# Patient Record
Sex: FEMALE | Race: WHITE | NOT HISPANIC OR LATINO | Employment: FULL TIME | URBAN - METROPOLITAN AREA
[De-identification: names, ages, dates, MRNs, and addresses within clinical notes are randomized per-mention and may not be internally consistent; named-entity substitution may affect disease eponyms.]

---

## 2019-03-14 ENCOUNTER — OFFICE VISIT (OUTPATIENT)
Dept: OBGYN CLINIC | Facility: CLINIC | Age: 49
End: 2019-03-14
Payer: COMMERCIAL

## 2019-03-14 VITALS
DIASTOLIC BLOOD PRESSURE: 70 MMHG | HEIGHT: 63 IN | WEIGHT: 137 LBS | SYSTOLIC BLOOD PRESSURE: 116 MMHG | BODY MASS INDEX: 24.27 KG/M2

## 2019-03-14 DIAGNOSIS — Z12.39 SCREENING BREAST EXAMINATION: ICD-10-CM

## 2019-03-14 DIAGNOSIS — Z01.419 CERVICAL SMEAR, AS PART OF ROUTINE GYNECOLOGICAL EXAMINATION: ICD-10-CM

## 2019-03-14 DIAGNOSIS — Z13.820 SCREENING FOR OSTEOPOROSIS: ICD-10-CM

## 2019-03-14 DIAGNOSIS — Z11.51 SPECIAL SCREENING EXAMINATION FOR HUMAN PAPILLOMAVIRUS (HPV): ICD-10-CM

## 2019-03-14 DIAGNOSIS — E28.39 PREMATURE OVARIAN FAILURE: ICD-10-CM

## 2019-03-14 DIAGNOSIS — Z01.419 ENCOUNTER FOR WELL WOMAN EXAM: Primary | ICD-10-CM

## 2019-03-14 PROCEDURE — 99386 PREV VISIT NEW AGE 40-64: CPT | Performed by: PHYSICIAN ASSISTANT

## 2019-03-14 RX ORDER — ESTRADIOL 0.03 MG/D
1 FILM, EXTENDED RELEASE TRANSDERMAL 2 TIMES WEEKLY
COMMUNITY
End: 2019-03-20

## 2019-03-20 LAB
HPV HR 12 DNA CVX QL NAA+PROBE: NOT DETECTED
HPV16 DNA SPEC QL NAA+PROBE: NOT DETECTED
HPV18 DNA SPEC QL NAA+PROBE: NOT DETECTED
THIN PREP CVX: NORMAL

## 2019-03-20 RX ORDER — ESTRADIOL 1 MG/1
0.5 TABLET ORAL 3 TIMES DAILY
Qty: 90 TABLET | Refills: 4 | Status: SHIPPED | OUTPATIENT
Start: 2019-03-20 | End: 2020-03-16 | Stop reason: SDUPTHER

## 2019-03-20 RX ORDER — MEDROXYPROGESTERONE ACETATE 5 MG/1
5 TABLET ORAL DAILY
Qty: 30 TABLET | Refills: 11 | Status: SHIPPED | OUTPATIENT
Start: 2019-03-20 | End: 2020-03-16 | Stop reason: SDUPTHER

## 2019-03-20 NOTE — PROGRESS NOTES
Assessment/Plan:    No problem-specific Assessment & Plan notes found for this encounter  Diagnoses and all orders for this visit:    Encounter for well woman exam    Screening breast examination  -     Mammo screening bilateral w cad; Future    Cervical smear, as part of routine gynecological examination  -     GP Pap Dependent HPV Cotest >= 27years old    Special screening examination for human papillomavirus (HPV)  -     GP Pap Dependent HPV Cotest >= 27years old    Premature ovarian failure  -     DXA bone density spine hip and pelvis; Future  -     medroxyPROGESTERone (PROVERA) 5 mg tablet; Take 1 tablet (5 mg total) by mouth daily  -     estradiol (ESTRACE) 1 mg tablet; Take 0 5 tablets (0 5 mg total) by mouth 3 (three) times a day    Screening for osteoporosis  -     DXA bone density spine hip and pelvis; Future    Other orders  -     Discontinue: estradiol (VIVELLE-DOT) 0 025 MG/24HR; Place 1 patch on the skin 2 (two) times a week          Subjective:      Patient ID: Stephanie Chavarria is a 57 y o  female  Pt presents for her yearly exam--  She has no complaints  She denies any bleeding or pelvic pain  Bowel and bladder are regular  No  Breast concerns  She does have a history + for POF--she currently takes estrogen   5mg 3 tabs daily and progesterone 5mg 1x daily  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 The following portions of the patient's history were reviewed and updated as appropriate: allergies, current medications, past family history, past medical history, past social history, past surgical history and problem list     Review of Systems   Constitutional: Negative for chills, fever and unexpected weight change  Gastrointestinal: Negative for abdominal pain, blood in stool, constipation and diarrhea  Genitourinary: Negative            Objective:      /70 (BP Location: Right arm, Patient Position: Sitting, Cuff Size: Standard)   Ht 5' 3" (1 6 m)   Wt 62 1 kg (137 lb)   BMI 24 27 kg/m² Physical Exam   Constitutional: She appears well-developed and well-nourished  HENT:   Head: Normocephalic and atraumatic  Neck: Normal range of motion  Pulmonary/Chest: Right breast exhibits no inverted nipple, no mass, no nipple discharge and no skin change  Left breast exhibits no inverted nipple, no mass, no nipple discharge and no skin change  Abdominal: Soft  Genitourinary: Vagina normal and uterus normal  Pelvic exam was performed with patient supine  There is no rash, tenderness or lesion on the right labia  There is no rash, tenderness or lesion on the left labia  Cervix exhibits no motion tenderness, no discharge and no friability  Right adnexum displays no mass, no tenderness and no fullness  Left adnexum displays no mass, no tenderness and no fullness  Lymphadenopathy: No inguinal adenopathy noted on the right or left side  Nursing note and vitals reviewed

## 2019-07-14 ENCOUNTER — TRANSCRIBE ORDERS (OUTPATIENT)
Dept: ADMINISTRATIVE | Age: 49
End: 2019-07-14

## 2019-07-14 ENCOUNTER — APPOINTMENT (OUTPATIENT)
Dept: LAB | Age: 49
End: 2019-07-14
Payer: COMMERCIAL

## 2019-07-14 DIAGNOSIS — R00.2 PALPITATIONS: ICD-10-CM

## 2019-07-14 DIAGNOSIS — Z00.00 HEALTH MAINTENANCE EXAMINATION: ICD-10-CM

## 2019-07-14 DIAGNOSIS — R06.00 DYSPNEA, UNSPECIFIED TYPE: ICD-10-CM

## 2019-07-14 DIAGNOSIS — R06.00 DYSPNEA, UNSPECIFIED TYPE: Primary | ICD-10-CM

## 2019-07-14 LAB
ALBUMIN SERPL BCP-MCNC: 3.6 G/DL (ref 3.5–5)
ALP SERPL-CCNC: 67 U/L (ref 46–116)
ALT SERPL W P-5'-P-CCNC: 21 U/L (ref 12–78)
ANION GAP SERPL CALCULATED.3IONS-SCNC: 2 MMOL/L (ref 4–13)
AST SERPL W P-5'-P-CCNC: 13 U/L (ref 5–45)
BACTERIA UR QL AUTO: ABNORMAL /HPF
BILIRUB SERPL-MCNC: 0.36 MG/DL (ref 0.2–1)
BILIRUB UR QL STRIP: NEGATIVE
BUN SERPL-MCNC: 14 MG/DL (ref 5–25)
CALCIUM SERPL-MCNC: 9.2 MG/DL (ref 8.3–10.1)
CHLORIDE SERPL-SCNC: 106 MMOL/L (ref 100–108)
CHOLEST SERPL-MCNC: 226 MG/DL (ref 50–200)
CLARITY UR: CLEAR
CO2 SERPL-SCNC: 31 MMOL/L (ref 21–32)
COLOR UR: YELLOW
CREAT SERPL-MCNC: 0.95 MG/DL (ref 0.6–1.3)
GFR SERPL CREATININE-BSD FRML MDRD: 72 ML/MIN/1.73SQ M
GLUCOSE P FAST SERPL-MCNC: 89 MG/DL (ref 65–99)
GLUCOSE UR STRIP-MCNC: NEGATIVE MG/DL
HDLC SERPL-MCNC: 66 MG/DL (ref 40–60)
HGB UR QL STRIP.AUTO: NEGATIVE
HYALINE CASTS #/AREA URNS LPF: ABNORMAL /LPF
KETONES UR STRIP-MCNC: NEGATIVE MG/DL
LDLC SERPL CALC-MCNC: 132 MG/DL (ref 0–100)
LEUKOCYTE ESTERASE UR QL STRIP: ABNORMAL
NITRITE UR QL STRIP: NEGATIVE
NON-SQ EPI CELLS URNS QL MICRO: ABNORMAL /HPF
NONHDLC SERPL-MCNC: 160 MG/DL
PH UR STRIP.AUTO: 6.5 [PH]
POTASSIUM SERPL-SCNC: 3.5 MMOL/L (ref 3.5–5.3)
PROT SERPL-MCNC: 7.2 G/DL (ref 6.4–8.2)
PROT UR STRIP-MCNC: NEGATIVE MG/DL
RBC #/AREA URNS AUTO: ABNORMAL /HPF
SODIUM SERPL-SCNC: 139 MMOL/L (ref 136–145)
SP GR UR STRIP.AUTO: 1.02 (ref 1–1.03)
TRIGL SERPL-MCNC: 141 MG/DL
TSH SERPL DL<=0.05 MIU/L-ACNC: 1.01 UIU/ML (ref 0.36–3.74)
UROBILINOGEN UR QL STRIP.AUTO: 0.2 E.U./DL
WBC #/AREA URNS AUTO: ABNORMAL /HPF

## 2019-07-14 PROCEDURE — 80053 COMPREHEN METABOLIC PANEL: CPT

## 2019-07-14 PROCEDURE — 84443 ASSAY THYROID STIM HORMONE: CPT

## 2019-07-14 PROCEDURE — 80061 LIPID PANEL: CPT

## 2019-07-14 PROCEDURE — 36415 COLL VENOUS BLD VENIPUNCTURE: CPT

## 2019-07-14 PROCEDURE — 81001 URINALYSIS AUTO W/SCOPE: CPT | Performed by: INTERNAL MEDICINE

## 2019-12-11 DIAGNOSIS — E28.39 PREMATURE OVARIAN FAILURE: ICD-10-CM

## 2019-12-11 DIAGNOSIS — Z13.820 SCREENING FOR OSTEOPOROSIS: ICD-10-CM

## 2019-12-11 DIAGNOSIS — Z12.39 SCREENING BREAST EXAMINATION: ICD-10-CM

## 2020-03-16 DIAGNOSIS — E28.39 PREMATURE OVARIAN FAILURE: ICD-10-CM

## 2020-03-16 RX ORDER — MEDROXYPROGESTERONE ACETATE 5 MG/1
5 TABLET ORAL DAILY
Qty: 90 TABLET | Refills: 0 | Status: SHIPPED | OUTPATIENT
Start: 2020-03-16

## 2020-03-16 RX ORDER — ESTRADIOL 1 MG/1
0.5 TABLET ORAL 3 TIMES DAILY
Qty: 270 TABLET | Refills: 0 | Status: SHIPPED | OUTPATIENT
Start: 2020-03-16

## 2020-03-23 DIAGNOSIS — E28.39 PREMATURE OVARIAN FAILURE: Primary | ICD-10-CM

## 2020-03-23 RX ORDER — ESTRADIOL 0.5 MG/1
0.5 TABLET ORAL 3 TIMES DAILY
Qty: 270 TABLET | Refills: 0 | Status: SHIPPED | OUTPATIENT
Start: 2020-03-23 | End: 2022-07-07 | Stop reason: SDUPTHER

## 2020-06-04 ENCOUNTER — ANNUAL EXAM (OUTPATIENT)
Dept: OBGYN CLINIC | Facility: CLINIC | Age: 50
End: 2020-06-04
Payer: COMMERCIAL

## 2020-06-04 DIAGNOSIS — Z01.419 ENCOUNTER FOR WELL WOMAN EXAM: Primary | ICD-10-CM

## 2020-06-04 DIAGNOSIS — Z12.39 ENCOUNTER FOR SCREENING BREAST EXAMINATION: ICD-10-CM

## 2020-06-04 DIAGNOSIS — E28.39 PREMATURE OVARIAN FAILURE: ICD-10-CM

## 2020-06-04 DIAGNOSIS — Z79.890 POST-MENOPAUSE ON HRT (HORMONE REPLACEMENT THERAPY): ICD-10-CM

## 2020-06-04 DIAGNOSIS — Z12.31 BREAST CANCER SCREENING BY MAMMOGRAM: ICD-10-CM

## 2020-06-04 PROCEDURE — 99396 PREV VISIT EST AGE 40-64: CPT | Performed by: PHYSICIAN ASSISTANT

## 2020-06-04 RX ORDER — ESTRADIOL 0.5 MG/1
0.5 TABLET ORAL 3 TIMES DAILY
Qty: 90 TABLET | Refills: 4 | Status: SHIPPED | OUTPATIENT
Start: 2020-06-04 | End: 2020-07-09

## 2020-06-04 RX ORDER — MEDROXYPROGESTERONE ACETATE 5 MG/1
5 TABLET ORAL DAILY
Qty: 90 TABLET | Refills: 4 | Status: SHIPPED | OUTPATIENT
Start: 2020-06-04 | End: 2022-07-07 | Stop reason: SDUPTHER

## 2020-07-09 DIAGNOSIS — Z79.890 POST-MENOPAUSE ON HRT (HORMONE REPLACEMENT THERAPY): ICD-10-CM

## 2020-07-09 RX ORDER — ESTRADIOL 0.5 MG/1
0.5 TABLET ORAL 3 TIMES DAILY
Qty: 270 TABLET | Refills: 2 | Status: SHIPPED | OUTPATIENT
Start: 2020-07-09 | End: 2021-04-14

## 2021-03-26 DIAGNOSIS — Z23 ENCOUNTER FOR IMMUNIZATION: ICD-10-CM

## 2021-04-14 DIAGNOSIS — Z79.890 POST-MENOPAUSE ON HRT (HORMONE REPLACEMENT THERAPY): ICD-10-CM

## 2021-04-14 RX ORDER — ESTRADIOL 0.5 MG/1
0.5 TABLET ORAL 3 TIMES DAILY
Qty: 270 TABLET | Refills: 2 | Status: SHIPPED | OUTPATIENT
Start: 2021-04-14

## 2021-06-14 ENCOUNTER — ANNUAL EXAM (OUTPATIENT)
Dept: OBGYN CLINIC | Facility: CLINIC | Age: 51
End: 2021-06-14
Payer: COMMERCIAL

## 2021-06-14 VITALS
BODY MASS INDEX: 24.54 KG/M2 | WEIGHT: 138.5 LBS | DIASTOLIC BLOOD PRESSURE: 66 MMHG | HEIGHT: 63 IN | SYSTOLIC BLOOD PRESSURE: 110 MMHG

## 2021-06-14 DIAGNOSIS — Z01.419 ENCOUNTER FOR WELL WOMAN EXAM: Primary | ICD-10-CM

## 2021-06-14 DIAGNOSIS — E28.39 PREMATURE OVARIAN FAILURE: ICD-10-CM

## 2021-06-14 DIAGNOSIS — Z13.820 SCREENING FOR OSTEOPOROSIS: ICD-10-CM

## 2021-06-14 DIAGNOSIS — Z12.31 ENCOUNTER FOR SCREENING MAMMOGRAM FOR BREAST CANCER: ICD-10-CM

## 2021-06-14 DIAGNOSIS — Z12.39 ENCOUNTER FOR SCREENING BREAST EXAMINATION: ICD-10-CM

## 2021-06-14 PROCEDURE — 99396 PREV VISIT EST AGE 40-64: CPT | Performed by: PHYSICIAN ASSISTANT

## 2021-06-14 RX ORDER — ESTRADIOL 0.5 MG/1
0.5 TABLET ORAL 3 TIMES DAILY
Qty: 270 TABLET | Refills: 4 | Status: SHIPPED | OUTPATIENT
Start: 2021-06-14 | End: 2022-06-16

## 2021-06-14 RX ORDER — DIPHENOXYLATE HYDROCHLORIDE AND ATROPINE SULFATE 2.5; .025 MG/1; MG/1
1 TABLET ORAL DAILY
COMMUNITY

## 2021-06-14 RX ORDER — MEDROXYPROGESTERONE ACETATE 5 MG/1
5 TABLET ORAL DAILY
Qty: 90 TABLET | Refills: 4 | Status: SHIPPED | OUTPATIENT
Start: 2021-06-14 | End: 2022-06-16

## 2021-06-14 NOTE — PROGRESS NOTES
Patient is here for yearly exam   Patient has no bleeding or pelvic pain  Patient has no breast concerns and B&B ok  Patient has history of Premature Ovarian Failure (POF)    Patient is not due for a pap smear at this visit  3/15/19 Normal Pap, Negative HPV   2/20/17 Normal Pap   2/2/16 Normal Pap, Negative HPV   12/11/20 Normal Mammo

## 2021-06-16 NOTE — PROGRESS NOTES
Assessment/Plan:    No problem-specific Assessment & Plan notes found for this encounter  Diagnoses and all orders for this visit:    Encounter for well woman exam    Encounter for screening breast examination    Encounter for screening mammogram for breast cancer  -     Mammo screening bilateral w 3d & cad; Future    Premature ovarian failure  -     estradiol (ESTRACE) 0 5 MG tablet; Take 1 tablet (0 5 mg total) by mouth 3 (three) times a day  -     medroxyPROGESTERone (PROVERA) 5 mg tablet; Take 1 tablet (5 mg total) by mouth daily    Screening for osteoporosis  -     DXA bone density spine hip and pelvis; Future    Other orders  -     multivitamin (THERAGRAN) TABS; Take 1 tablet by mouth daily          Subjective:      Patient ID: Judson Maza is a 52 y o  female  Pt presents for her annual exam today--  She has no complaints  No changes  She has no bleeding or pelvic pain--POF  Needs refill on meds  Bowel and bladder are regular  No breast concerns today  Last mammo--12/2020  Last DEXA 2019      No pap today  rx mammo  rx dexa  rx provera  rx estradiol      The following portions of the patient's history were reviewed and updated as appropriate: allergies, current medications, past family history, past medical history, past social history, past surgical history and problem list     Review of Systems   Constitutional: Negative for chills, fever and unexpected weight change  Gastrointestinal: Negative for abdominal pain, blood in stool, constipation and diarrhea  Genitourinary: Negative  Objective:      /66   Ht 5' 3" (1 6 m)   Wt 62 8 kg (138 lb 8 oz)   Breastfeeding No   BMI 24 53 kg/m²          Physical Exam  Vitals and nursing note reviewed  Constitutional:       Appearance: She is well-developed  HENT:      Head: Normocephalic and atraumatic  Chest:      Breasts:         Right: No inverted nipple, mass, nipple discharge or skin change           Left: No inverted nipple, mass, nipple discharge or skin change  Abdominal:      Palpations: Abdomen is soft  Genitourinary:     Exam position: Supine  Labia:         Right: No rash, tenderness or lesion  Left: No rash, tenderness or lesion  Vagina: Normal       Cervix: No cervical motion tenderness, discharge or friability  Adnexa:         Right: No mass, tenderness or fullness  Left: No mass, tenderness or fullness  Musculoskeletal:      Cervical back: Normal range of motion  Lymphadenopathy:      Lower Body: No right inguinal adenopathy  No left inguinal adenopathy

## 2021-08-13 ENCOUNTER — OFFICE VISIT (OUTPATIENT)
Dept: OBGYN CLINIC | Facility: CLINIC | Age: 51
End: 2021-08-13
Payer: COMMERCIAL

## 2021-08-13 VITALS
BODY MASS INDEX: 23.92 KG/M2 | DIASTOLIC BLOOD PRESSURE: 80 MMHG | SYSTOLIC BLOOD PRESSURE: 140 MMHG | HEIGHT: 63 IN | WEIGHT: 135 LBS

## 2021-08-13 DIAGNOSIS — R10.2 PELVIC PAIN: Primary | ICD-10-CM

## 2021-08-13 DIAGNOSIS — R31.9 URINARY TRACT INFECTION WITH HEMATURIA, SITE UNSPECIFIED: ICD-10-CM

## 2021-08-13 DIAGNOSIS — N39.0 URINARY TRACT INFECTION WITH HEMATURIA, SITE UNSPECIFIED: ICD-10-CM

## 2021-08-13 DIAGNOSIS — R14.0 BLOATING: ICD-10-CM

## 2021-08-13 DIAGNOSIS — M54.50 ACUTE BILATERAL LOW BACK PAIN WITHOUT SCIATICA: ICD-10-CM

## 2021-08-13 LAB
SL AMB  POCT GLUCOSE, UA: NEGATIVE
SL AMB LEUKOCYTE ESTERASE,UA: ABNORMAL
SL AMB POCT BILIRUBIN,UA: NEGATIVE
SL AMB POCT BLOOD,UA: ABNORMAL
SL AMB POCT CLARITY,UA: ABNORMAL
SL AMB POCT COLOR,UA: YELLOW
SL AMB POCT KETONES,UA: NEGATIVE
SL AMB POCT NITRITE,UA: NEGATIVE
SL AMB POCT PH,UA: 5
SL AMB POCT SPECIFIC GRAVITY,UA: 1000
SL AMB POCT URINE PROTEIN: NEGATIVE
SL AMB POCT UROBILINOGEN: NORMAL

## 2021-08-13 PROCEDURE — 81002 URINALYSIS NONAUTO W/O SCOPE: CPT | Performed by: PHYSICIAN ASSISTANT

## 2021-08-13 PROCEDURE — 99213 OFFICE O/P EST LOW 20 MIN: CPT | Performed by: PHYSICIAN ASSISTANT

## 2021-08-13 RX ORDER — CIPROFLOXACIN 500 MG/1
500 TABLET, FILM COATED ORAL EVERY 12 HOURS SCHEDULED
Qty: 14 TABLET | Refills: 1 | Status: SHIPPED | OUTPATIENT
Start: 2021-08-13 | End: 2021-08-20

## 2021-08-13 NOTE — PROGRESS NOTES
Assessment/Plan:    No problem-specific Assessment & Plan notes found for this encounter  Diagnoses and all orders for this visit:    Pelvic pain  -     POCT urine dip  -     UA w Reflex to Microscopic w Reflex to Culture; Future    Acute bilateral low back pain without sciatica    Bloating    Urinary tract infection with hematuria, site unspecified  -     UA w Reflex to Microscopic w Reflex to Culture; Future  -     ciprofloxacin (CIPRO) 500 mg tablet; Take 1 tablet (500 mg total) by mouth every 12 (twelve) hours for 7 days          Subjective:      Patient ID: Don Escobar is a 54 y o  female  Pt presents for a problem today  She complains of pelvic cramping/pressure off and on x few weeks  More recently, has been experiencing LBP  No urinary burning  No vaginal discharge  No itching or burning    UA is +  rx cipro  rec daily cranberry and probiotic  Hydrate!!  Witch hazel       The following portions of the patient's history were reviewed and updated as appropriate: allergies, current medications, past family history, past medical history, past social history, past surgical history and problem list     Review of Systems   Constitutional: Negative for chills, fever and unexpected weight change  Gastrointestinal: Negative for abdominal pain, blood in stool, constipation and diarrhea  Genitourinary: Positive for flank pain and pelvic pain  Negative for dysuria and hematuria  Objective:      /80   Ht 5' 3" (1 6 m)   Wt 61 2 kg (135 lb)   LMP  (LMP Unknown)   BMI 23 91 kg/m²          Physical Exam  Vitals and nursing note reviewed  Constitutional:       Appearance: She is well-developed  Genitourinary:     Exam position: Supine  Labia:         Right: No rash or lesion  Left: No rash or lesion  Vagina: Normal       Cervix: No cervical motion tenderness, discharge or friability  Lymphadenopathy:      Lower Body: No right inguinal adenopathy   No left inguinal adenopathy

## 2021-08-13 NOTE — PROGRESS NOTES
The patient is here because she is having pelvic cramping, abdominal bloating and back pain  The patient has mild cramping on both sides  The pain radiates to her lower back  No urinary burning  The patient has some pressure  The patient has been having these symptoms for a month  The patient has hemorrhoids and sometimes has rectal bleeding

## 2021-08-17 ENCOUNTER — APPOINTMENT (OUTPATIENT)
Dept: LAB | Age: 51
End: 2021-08-17
Payer: COMMERCIAL

## 2021-08-17 ENCOUNTER — TELEPHONE (OUTPATIENT)
Dept: OBGYN CLINIC | Facility: CLINIC | Age: 51
End: 2021-08-17

## 2021-08-17 DIAGNOSIS — R31.9 URINARY TRACT INFECTION WITH HEMATURIA, SITE UNSPECIFIED: ICD-10-CM

## 2021-08-17 DIAGNOSIS — N39.0 URINARY TRACT INFECTION WITH HEMATURIA, SITE UNSPECIFIED: ICD-10-CM

## 2021-08-17 DIAGNOSIS — R10.2 PELVIC PAIN: ICD-10-CM

## 2021-08-17 LAB
BACTERIA UR QL AUTO: ABNORMAL /HPF
BILIRUB UR QL STRIP: NEGATIVE
CLARITY UR: CLEAR
COLOR UR: YELLOW
GLUCOSE UR STRIP-MCNC: NEGATIVE MG/DL
HGB UR QL STRIP.AUTO: NEGATIVE
HYALINE CASTS #/AREA URNS LPF: ABNORMAL /LPF
KETONES UR STRIP-MCNC: NEGATIVE MG/DL
LEUKOCYTE ESTERASE UR QL STRIP: ABNORMAL
NITRITE UR QL STRIP: NEGATIVE
NON-SQ EPI CELLS URNS QL MICRO: ABNORMAL /HPF
PH UR STRIP.AUTO: 6.5 [PH]
PROT UR STRIP-MCNC: NEGATIVE MG/DL
RBC #/AREA URNS AUTO: ABNORMAL /HPF
SP GR UR STRIP.AUTO: 1.01 (ref 1–1.03)
UROBILINOGEN UR QL STRIP.AUTO: 0.2 E.U./DL
WBC #/AREA URNS AUTO: ABNORMAL /HPF

## 2021-08-17 PROCEDURE — 81001 URINALYSIS AUTO W/SCOPE: CPT

## 2021-08-17 NOTE — TELEPHONE ENCOUNTER
Pt called stating that she started the Cipro she was given at her appt on 8/13/21 and she is not feeling better today  She feels worse  She is now having some back pain too  Per Dinorah pt advised she can go have the UA, C+S done and we will check the results and I asked pt if she had a urologist   I told her we can give her a number for one  Pt understood and she said she will go for test today and wait for results

## 2021-08-18 DIAGNOSIS — N39.0 UTI (URINARY TRACT INFECTION): Primary | ICD-10-CM

## 2021-08-18 RX ORDER — CEPHALEXIN 500 MG/1
500 CAPSULE ORAL EVERY 12 HOURS SCHEDULED
Qty: 14 CAPSULE | Refills: 0 | Status: SHIPPED | OUTPATIENT
Start: 2021-08-18 | End: 2021-08-25

## 2021-08-20 ENCOUNTER — TELEPHONE (OUTPATIENT)
Dept: UROLOGY | Facility: AMBULATORY SURGERY CENTER | Age: 51
End: 2021-08-20

## 2021-08-20 NOTE — TELEPHONE ENCOUNTER
Please Triage - [place]  New Patient- patients OBGYN office called in for patient noemy Herrera [phone]    What is the reason for the patients appointment? Acute cystitis (recurring uti) patient was on medications (Cipro & Keflex)  for a month but not working  Imaging/Lab Results: lab (8/17/21)      Do we accept the patient's insurance or is the patient Self-Pay?   Provider & Plan: [facility]  Member ID#: [id number]     Has the patient had any previous urologist(s)? no       Have patient records been requested? epic       Has the patient had any outside testing done? epic      Does the patient have a personal history of cancer? no      Patient can be reached at : [phone]

## 2021-08-26 ENCOUNTER — TELEPHONE (OUTPATIENT)
Dept: OBGYN CLINIC | Facility: CLINIC | Age: 51
End: 2021-08-26

## 2021-08-26 NOTE — TELEPHONE ENCOUNTER
Pt called stating that she finished the Keflex but she still doesn't feel better  She is having back pain also  Per Dinorah pt advised to go for a pelvic u/s, kidney/bladder u/s and another ua, c+s  Orders put in [facility] [address]  Pt will call Radiology and MRI to see if she can get in and if not she will call [facility]'s central scheduling

## 2021-08-27 ENCOUNTER — APPOINTMENT (OUTPATIENT)
Dept: LAB | Age: 51
End: 2021-08-27
Payer: COMMERCIAL

## 2021-08-27 DIAGNOSIS — M54.50 ACUTE BILATERAL LOW BACK PAIN WITHOUT SCIATICA: ICD-10-CM

## 2021-08-27 DIAGNOSIS — N39.0 UTI (URINARY TRACT INFECTION): ICD-10-CM

## 2021-08-27 DIAGNOSIS — R10.2 PELVIC PAIN: ICD-10-CM

## 2021-08-27 DIAGNOSIS — B99.9 PERSISTENT INFECTION: ICD-10-CM

## 2021-08-27 LAB
BILIRUB UR QL STRIP: NEGATIVE
CLARITY UR: CLEAR
COLOR UR: YELLOW
GLUCOSE UR STRIP-MCNC: NEGATIVE MG/DL
HGB UR QL STRIP.AUTO: NEGATIVE
KETONES UR STRIP-MCNC: NEGATIVE MG/DL
LEUKOCYTE ESTERASE UR QL STRIP: NEGATIVE
NITRITE UR QL STRIP: NEGATIVE
PH UR STRIP.AUTO: 6.5 [PH]
PROT UR STRIP-MCNC: NEGATIVE MG/DL
SP GR UR STRIP.AUTO: 1 (ref 1–1.03)
UROBILINOGEN UR QL STRIP.AUTO: 0.2 E.U./DL

## 2021-08-27 PROCEDURE — 81003 URINALYSIS AUTO W/O SCOPE: CPT

## 2021-09-01 ENCOUNTER — TELEPHONE (OUTPATIENT)
Dept: UROLOGY | Facility: MEDICAL CENTER | Age: 51
End: 2021-09-01

## 2021-09-01 NOTE — TELEPHONE ENCOUNTER
Patients pcp office called in stating patient did get [address] which showed Bladder and kidney stone  Patient stated the bladder stone is giving her pain and asked if she can come in earlier then 9/13

## 2021-09-01 NOTE — TELEPHONE ENCOUNTER
New patient to return office call regarding upcoming appointment  Pt is scheduled to be seen by urology as a new patient 9/13/21 [facility] office  Appointment has been made for recurrent uti by pts obgyn office on 8/20/21  Pt was treated for a urinary tract infection 8/18/21 and has completed a 7 days course of keflex with last dose 8/29/21  Pt states she is well hydrated  Patient has reported she had an ultra sound of the kidney and bladder ordered by her obgyn JOSE CRUZ on 8/27/21  Results of the above Intrasound found non obstructed 3 mm kidney stone and 4 mm stone in pts bladder  Pt reports she has urinary frequency and back pain located center of her back since 7/21/21  Pt states the pain waxes and weans  Pt has denied any other symptoms at this time  Attempt was made to offer patient 2 appointmenst for today in the [facility] office to be evaluated for above  Patient has declined both  AppointmentS  Advised patient next available appointment would be 10/21  In discussion with patient regarding a sooner appointment availability patient has agreed to keep 9/13/21 appointment  Please be advised   Thank you

## 2021-09-01 NOTE — TELEPHONE ENCOUNTER
Attempted to reach patient at [phone] to discuss the scheduling of an appointment  A voice message was left for pt to return office call  Office number was provided

## 2021-09-01 NOTE — TELEPHONE ENCOUNTER
Patient was contacted at [phone]  Advised patient PATERRENCE comment regarding pts 9/13/21 appointment being appropriate based on patients ultra sound results  Patient very grateful for the call and all the effort that has been made in attempting to change her appointment

## 2021-09-09 ENCOUNTER — OFFICE VISIT (OUTPATIENT)
Dept: UROLOGY | Facility: CLINIC | Age: 51
End: 2021-09-09
Payer: COMMERCIAL

## 2021-09-09 VITALS
HEIGHT: 63 IN | HEART RATE: 70 BPM | BODY MASS INDEX: 24.1 KG/M2 | DIASTOLIC BLOOD PRESSURE: 62 MMHG | SYSTOLIC BLOOD PRESSURE: 112 MMHG | WEIGHT: 136 LBS

## 2021-09-09 DIAGNOSIS — N39.0 FREQUENT UTI: Primary | ICD-10-CM

## 2021-09-09 DIAGNOSIS — N21.0 BLADDER CALCULUS: ICD-10-CM

## 2021-09-09 LAB
SL AMB  POCT GLUCOSE, UA: NORMAL
SL AMB LEUKOCYTE ESTERASE,UA: NORMAL
SL AMB POCT BILIRUBIN,UA: NORMAL
SL AMB POCT BLOOD,UA: NORMAL
SL AMB POCT CLARITY,UA: CLEAR
SL AMB POCT COLOR,UA: YELLOW
SL AMB POCT KETONES,UA: NORMAL
SL AMB POCT NITRITE,UA: NORMAL
SL AMB POCT PH,UA: 5
SL AMB POCT SPECIFIC GRAVITY,UA: 1.01
SL AMB POCT URINE PROTEIN: NORMAL
SL AMB POCT UROBILINOGEN: 0.2

## 2021-09-09 PROCEDURE — 81002 URINALYSIS NONAUTO W/O SCOPE: CPT | Performed by: PHYSICIAN ASSISTANT

## 2021-09-09 PROCEDURE — 87086 URINE CULTURE/COLONY COUNT: CPT | Performed by: PHYSICIAN ASSISTANT

## 2021-09-09 PROCEDURE — 87147 CULTURE TYPE IMMUNOLOGIC: CPT | Performed by: PHYSICIAN ASSISTANT

## 2021-09-09 PROCEDURE — 99244 OFF/OP CNSLTJ NEW/EST MOD 40: CPT | Performed by: PHYSICIAN ASSISTANT

## 2021-09-09 RX ORDER — PHENAZOPYRIDINE HYDROCHLORIDE 100 MG/1
100 TABLET, FILM COATED ORAL 3 TIMES DAILY PRN
Qty: 20 TABLET | Refills: 0 | Status: SHIPPED | OUTPATIENT
Start: 2021-09-09

## 2021-09-09 RX ORDER — TAMSULOSIN HYDROCHLORIDE 0.4 MG/1
0.4 CAPSULE ORAL
Qty: 7 CAPSULE | Refills: 0 | Status: SHIPPED | OUTPATIENT
Start: 2021-09-09 | End: 2021-09-16

## 2021-09-09 NOTE — PROGRESS NOTES
9/9/2021      Chief Complaint   Patient presents with    Frequent UTI         Assessment and Plan    54 y o  female NEW PATIENT    1  Renal calculi  -  Small bilateral nonobstructing renal calculi  -   AUA stone diet guide and hydration goals reviewed, no intervention at this time    2  Bladder calculus  -  No spontaneous passage thus far  -  Remains symptomatic  -  Repeat CT pelvis without contrast to further identify location position of stone  -  Trial Flomax and Pyridium for bladder irritation, she may require cystoscopy it extraction of bladder calculus if no spontaneous passage in persistent symptoms     follow-up CT scan for bladder calculus presence/position, possible cystoscopy/cystolitholapaxy/stone extraction    History of Present Illness  Josiane English is a 54 y o  female here for evaluation of  New patient cystitis symptoms which began six weeks ago  Pressure, Cramping, low back pain, dysuria, frequency and urgency  Urine testing performed by gynecology showed hematuria, leukocytes she was treated with Cipro than Keflex there is no culture data as the urinalysis did not reflex  Her urine today is normal   She had ultrasound of her kidneys and bladder demonstrating small nonobstructing renal stones and a 4 mm calculus within the bladder lumen just over one week ago  This bladder calculus is probably the source of her  Symptoms  She did feel a lot better within the past week but still has some cramping again this morning  She has not seen felt or heard any stone material past   She has no flank pain or fevers  No gross hematuria  Review of Systems   Constitutional: Negative for activity change, appetite change, chills, fever and unexpected weight change  HENT: Negative  Respiratory: Negative  Negative for shortness of breath  Cardiovascular: Negative  Negative for chest pain  Gastrointestinal: Negative for abdominal pain, diarrhea, nausea and vomiting  Endocrine: Negative  Genitourinary: Positive for pelvic pain and urgency  Negative for decreased urine volume, difficulty urinating, dysuria, flank pain, frequency and hematuria  Musculoskeletal: Negative for back pain and gait problem  Skin: Negative  Allergic/Immunologic: Negative  Neurological: Negative  Hematological: Negative for adenopathy  Does not bruise/bleed easily  Vitals  Vitals:    09/09/21 0853   BP: 112/62   BP Location: Left arm   Patient Position: Sitting   Cuff Size: Adult   Pulse: 70   Weight: 61 7 kg (136 lb)   Height: 5' 3" (1 6 m)       Physical Exam  Vitals and nursing note reviewed  Constitutional:       General: She is not in acute distress  Appearance: Normal appearance  She is well-developed  She is not diaphoretic  HENT:      Head: Normocephalic and atraumatic  Pulmonary:      Effort: Pulmonary effort is normal    Abdominal:      Tenderness: There is no abdominal tenderness  There is no right CVA tenderness or left CVA tenderness  Skin:     General: Skin is warm and dry  Neurological:      Mental Status: She is alert and oriented to person, place, and time  Gait: Gait normal    Psychiatric:         Speech: Speech normal          Behavior: Behavior normal            Past History  Past Medical History:   Diagnosis Date    Osteopenia     on DEXA    Papanicolaou smear 02/20/2017    NEG    Premature ovarian failure      Social History     Socioeconomic History    Marital status: /Civil Union     Spouse name: None    Number of children: 2    Years of education: None    Highest education level: None   Occupational History    None   Tobacco Use    Smoking status: Never Smoker    Smokeless tobacco: Never Used   Vaping Use    Vaping Use: Never used   Substance and Sexual Activity    Alcohol use:  Yes    Drug use: Never    Sexual activity: Yes     Partners: Male   Other Topics Concern    None   Social History Narrative    Drinks coffee occasionally Social Determinants of Health     Financial Resource Strain:     Difficulty of Paying Living Expenses:    Food Insecurity:     Worried About Running Out of Food in the Last Year:     [address] in the Last Year:    Transportation Needs:     Lack of Transportation (Medical):  Lack of Transportation (Non-Medical):    Physical Activity:     Days of Exercise per Week:     Minutes of Exercise per Session:    Stress:     Feeling of Stress :    Social Connections:     Frequency of Communication with Friends and Family:     Frequency of Social Gatherings with Friends and Family:     Attends Gnosticism Services:     Active Member of Clubs or Organizations:     Attends Club or Organization Meetings:     Marital Status:    Intimate Partner Violence:     Fear of Current or Ex-Partner:     Emotionally Abused:     Physically Abused:     Sexually Abused:      Social History     Tobacco Use   Smoking Status Never Smoker   Smokeless Tobacco Never Used     Family History   Problem Relation Age of Onset    Breast cancer Maternal Aunt 27    Heart attack Father     Asthma Sister        The following portions of the patient's history were reviewed and updated as appropriate: allergies, current medications, past medical history, past social history, past surgical history and problem list     Results  No results found for this or any previous visit (from the past 1 hour(s))  ]  No results found for: PSA  Lab Results   Component Value Date    GLUCOSE 86 06/14/2014    CALCIUM 9 2 07/14/2019     06/14/2014    K 3 5 07/14/2019    CO2 31 07/14/2019     07/14/2019    BUN 14 07/14/2019    CREATININE 0 95 07/14/2019     Lab Results   Component Value Date    WBC 4 84 06/14/2014    HGB 12 6 06/14/2014    HCT 39 0 06/14/2014    MCV 93 06/14/2014     06/14/2014

## 2021-09-10 ENCOUNTER — HOSPITAL ENCOUNTER (OUTPATIENT)
Dept: RADIOLOGY | Age: 51
Discharge: HOME | End: 2021-09-10
Payer: COMMERCIAL

## 2021-09-10 DIAGNOSIS — N21.0 BLADDER CALCULUS: ICD-10-CM

## 2021-09-10 LAB
BACTERIA UR CULT: ABNORMAL
BACTERIA UR CULT: ABNORMAL

## 2021-09-10 PROCEDURE — 72192 CT PELVIS W/O DYE: CPT

## 2021-09-10 PROCEDURE — G1004 CDSM NDSC: HCPCS

## 2021-09-12 ENCOUNTER — TELEPHONE (OUTPATIENT)
Dept: OTHER | Facility: OTHER | Age: 51
End: 2021-09-12

## 2021-09-12 DIAGNOSIS — R82.71 BACTERIURIA: Primary | ICD-10-CM

## 2021-09-12 NOTE — TELEPHONE ENCOUNTER
The medication that was prescribed for a bladder stone, but I no longer have it  Patient is referring to her MyChart  Her urine culture is showing an infection instead  She is wondering if she even needs the medication anymore  It was for pyridium  Please call her back to see which medications she should be taking and if she needs a nother visit

## 2021-09-13 RX ORDER — NITROFURANTOIN 25; 75 MG/1; MG/1
100 CAPSULE ORAL 2 TIMES DAILY
Qty: 10 CAPSULE | Refills: 0 | Status: SHIPPED | OUTPATIENT
Start: 2021-09-13 | End: 2021-09-18

## 2021-09-13 NOTE — TELEPHONE ENCOUNTER
CT reviewed just now, good news her stone has passed  I do recommend she start abx for some residual bacteriuria  Sent macrobid to her pharmacy just now  She will not need to take any additional flomax  She will be glad to hear she will not need procedure/surgery

## 2021-09-13 NOTE — TELEPHONE ENCOUNTER
Called and spoke with patient  She is aware stone has passed and she does not need to take Pyridium or Flomax at this time  Also aware Aime Carlson was called into pharmacy for residual bacteriuria  Patient verbalized understanding

## 2021-09-20 ENCOUNTER — TELEPHONE (OUTPATIENT)
Dept: UROLOGY | Facility: MEDICAL CENTER | Age: 51
End: 2021-09-20

## 2021-09-20 DIAGNOSIS — N39.0 FREQUENT UTI: Primary | ICD-10-CM

## 2021-09-20 NOTE — TELEPHONE ENCOUNTER
Patient left a message on the nurse line requesting a call back regarding urinary tract infection symptoms  attempted to reach pt at [phone]  A voice message was left for pt to call the office

## 2021-09-20 NOTE — TELEPHONE ENCOUNTER
Called pt and left VM to return call to office  No comm consent on file  When pt returns call she is to be told to go for urine testing for unresolved UTI symptoms  Order in chart

## 2021-09-20 NOTE — TELEPHONE ENCOUNTER
Patient calling to report she has just finished Macrobid for a urinary tract infection  Last dose was 9/18/21  Pt states she still has abdominal cramping and lower back pain  Pt is well hydrated  Pt denies any other symptoms at this time  Pt asking for further advise   Thank you

## 2021-09-21 ENCOUNTER — APPOINTMENT (OUTPATIENT)
Dept: LAB | Age: 51
End: 2021-09-21
Payer: COMMERCIAL

## 2021-09-21 ENCOUNTER — TELEPHONE (OUTPATIENT)
Dept: UROLOGY | Facility: MEDICAL CENTER | Age: 51
End: 2021-09-21

## 2021-09-21 DIAGNOSIS — N39.0 FREQUENT UTI: ICD-10-CM

## 2021-09-21 PROCEDURE — 87086 URINE CULTURE/COLONY COUNT: CPT

## 2021-09-21 PROCEDURE — 87147 CULTURE TYPE IMMUNOLOGIC: CPT

## 2021-09-23 ENCOUNTER — TELEPHONE (OUTPATIENT)
Dept: UROLOGY | Facility: MEDICAL CENTER | Age: 51
End: 2021-09-23

## 2021-09-23 DIAGNOSIS — N39.0 URINARY TRACT INFECTION WITHOUT HEMATURIA, SITE UNSPECIFIED: Primary | ICD-10-CM

## 2021-09-23 LAB
BACTERIA UR CULT: ABNORMAL
BACTERIA UR CULT: ABNORMAL

## 2021-09-23 RX ORDER — CEPHALEXIN 500 MG/1
500 CAPSULE ORAL EVERY 6 HOURS SCHEDULED
Qty: 28 CAPSULE | Refills: 0 | Status: SHIPPED | OUTPATIENT
Start: 2021-09-23 | End: 2021-09-30

## 2021-09-23 NOTE — TELEPHONE ENCOUNTER
Spoke to pt and advised RX for Keflex was called in to her pharmacy for UTI  She was advised to hydrate well and take Pyridium for any discomfort

## 2021-10-14 ENCOUNTER — APPOINTMENT (OUTPATIENT)
Dept: LAB | Age: 51
End: 2021-10-14
Payer: COMMERCIAL

## 2021-10-14 DIAGNOSIS — E78.2 MIXED HYPERLIPIDEMIA: ICD-10-CM

## 2021-10-14 DIAGNOSIS — N39.0 URINARY TRACT INFECTION WITHOUT HEMATURIA, SITE UNSPECIFIED: ICD-10-CM

## 2021-10-14 LAB
ALBUMIN SERPL BCP-MCNC: 3.7 G/DL (ref 3.5–5)
ALP SERPL-CCNC: 65 U/L (ref 46–116)
ALT SERPL W P-5'-P-CCNC: 19 U/L (ref 12–78)
ANION GAP SERPL CALCULATED.3IONS-SCNC: 4 MMOL/L (ref 4–13)
AST SERPL W P-5'-P-CCNC: 13 U/L (ref 5–45)
BASOPHILS # BLD AUTO: 0.07 THOUSANDS/ΜL (ref 0–0.1)
BASOPHILS NFR BLD AUTO: 1 % (ref 0–1)
BILIRUB SERPL-MCNC: 0.48 MG/DL (ref 0.2–1)
BUN SERPL-MCNC: 17 MG/DL (ref 5–25)
CALCIUM SERPL-MCNC: 9.6 MG/DL (ref 8.3–10.1)
CHLORIDE SERPL-SCNC: 110 MMOL/L (ref 100–108)
CHOLEST SERPL-MCNC: 247 MG/DL (ref 50–200)
CO2 SERPL-SCNC: 27 MMOL/L (ref 21–32)
CREAT SERPL-MCNC: 0.85 MG/DL (ref 0.6–1.3)
EOSINOPHIL # BLD AUTO: 0.18 THOUSAND/ΜL (ref 0–0.61)
EOSINOPHIL NFR BLD AUTO: 3 % (ref 0–6)
ERYTHROCYTE [DISTWIDTH] IN BLOOD BY AUTOMATED COUNT: 12 % (ref 11.6–15.1)
GFR SERPL CREATININE-BSD FRML MDRD: 81 ML/MIN/1.73SQ M
GLUCOSE P FAST SERPL-MCNC: 98 MG/DL (ref 65–99)
HCT VFR BLD AUTO: 40.9 % (ref 34.8–46.1)
HDLC SERPL-MCNC: 74 MG/DL
HGB BLD-MCNC: 12.7 G/DL (ref 11.5–15.4)
IMM GRANULOCYTES # BLD AUTO: 0.01 THOUSAND/UL (ref 0–0.2)
IMM GRANULOCYTES NFR BLD AUTO: 0 % (ref 0–2)
LDLC SERPL CALC-MCNC: 152 MG/DL (ref 0–100)
LYMPHOCYTES # BLD AUTO: 1.41 THOUSANDS/ΜL (ref 0.6–4.47)
LYMPHOCYTES NFR BLD AUTO: 23 % (ref 14–44)
MCH RBC QN AUTO: 30.3 PG (ref 26.8–34.3)
MCHC RBC AUTO-ENTMCNC: 31.1 G/DL (ref 31.4–37.4)
MCV RBC AUTO: 98 FL (ref 82–98)
MONOCYTES # BLD AUTO: 0.65 THOUSAND/ΜL (ref 0.17–1.22)
MONOCYTES NFR BLD AUTO: 11 % (ref 4–12)
NEUTROPHILS # BLD AUTO: 3.81 THOUSANDS/ΜL (ref 1.85–7.62)
NEUTS SEG NFR BLD AUTO: 62 % (ref 43–75)
NONHDLC SERPL-MCNC: 173 MG/DL
NRBC BLD AUTO-RTO: 0 /100 WBCS
PLATELET # BLD AUTO: 230 THOUSANDS/UL (ref 149–390)
PMV BLD AUTO: 11.5 FL (ref 8.9–12.7)
POTASSIUM SERPL-SCNC: 3.8 MMOL/L (ref 3.5–5.3)
PROT SERPL-MCNC: 7.1 G/DL (ref 6.4–8.2)
RBC # BLD AUTO: 4.19 MILLION/UL (ref 3.81–5.12)
SODIUM SERPL-SCNC: 141 MMOL/L (ref 136–145)
TRIGL SERPL-MCNC: 107 MG/DL
WBC # BLD AUTO: 6.13 THOUSAND/UL (ref 4.31–10.16)

## 2021-10-14 PROCEDURE — 87086 URINE CULTURE/COLONY COUNT: CPT

## 2021-10-14 PROCEDURE — 80053 COMPREHEN METABOLIC PANEL: CPT

## 2021-10-14 PROCEDURE — 80061 LIPID PANEL: CPT

## 2021-10-14 PROCEDURE — 85025 COMPLETE CBC W/AUTO DIFF WBC: CPT

## 2021-10-14 PROCEDURE — 87147 CULTURE TYPE IMMUNOLOGIC: CPT

## 2021-10-14 PROCEDURE — 36415 COLL VENOUS BLD VENIPUNCTURE: CPT

## 2021-10-15 LAB — BACTERIA UR CULT: ABNORMAL

## 2021-11-23 ENCOUNTER — APPOINTMENT (OUTPATIENT)
Dept: LAB | Age: 51
End: 2021-11-23
Payer: COMMERCIAL

## 2021-11-23 DIAGNOSIS — N39.0 URINARY TRACT INFECTION WITHOUT HEMATURIA, SITE UNSPECIFIED: Primary | ICD-10-CM

## 2022-06-16 ENCOUNTER — APPOINTMENT (OUTPATIENT)
Dept: LAB | Age: 52
End: 2022-06-16
Payer: COMMERCIAL

## 2022-06-16 DIAGNOSIS — E28.39 PREMATURE OVARIAN FAILURE: ICD-10-CM

## 2022-06-16 DIAGNOSIS — N39.0 URINARY TRACT INFECTION ASSOCIATED WITH CATHETERIZATION OF URINARY TRACT, UNSPECIFIED INDWELLING URINARY CATHETER TYPE, INITIAL ENCOUNTER (HCC): ICD-10-CM

## 2022-06-16 DIAGNOSIS — T83.511A URINARY TRACT INFECTION ASSOCIATED WITH CATHETERIZATION OF URINARY TRACT, UNSPECIFIED INDWELLING URINARY CATHETER TYPE, INITIAL ENCOUNTER (HCC): ICD-10-CM

## 2022-06-16 PROCEDURE — 87086 URINE CULTURE/COLONY COUNT: CPT

## 2022-06-16 RX ORDER — MEDROXYPROGESTERONE ACETATE 5 MG/1
TABLET ORAL
Qty: 90 TABLET | Refills: 4 | Status: SHIPPED | OUTPATIENT
Start: 2022-06-16

## 2022-06-16 RX ORDER — ESTRADIOL 0.5 MG/1
TABLET ORAL
Qty: 270 TABLET | Refills: 4 | Status: CANCELLED | OUTPATIENT
Start: 2022-06-16

## 2022-06-16 RX ORDER — ESTRADIOL 0.5 MG/1
TABLET ORAL
Qty: 270 TABLET | Refills: 4 | Status: SHIPPED | OUTPATIENT
Start: 2022-06-16

## 2022-06-17 LAB — BACTERIA UR CULT: NORMAL

## 2022-06-30 ENCOUNTER — ANNUAL EXAM (OUTPATIENT)
Dept: OBGYN CLINIC | Facility: CLINIC | Age: 52
End: 2022-06-30
Payer: COMMERCIAL

## 2022-06-30 VITALS
DIASTOLIC BLOOD PRESSURE: 70 MMHG | HEIGHT: 63 IN | SYSTOLIC BLOOD PRESSURE: 102 MMHG | BODY MASS INDEX: 23.21 KG/M2 | WEIGHT: 131 LBS

## 2022-06-30 DIAGNOSIS — Z11.51 SCREENING FOR HPV (HUMAN PAPILLOMAVIRUS): ICD-10-CM

## 2022-06-30 DIAGNOSIS — Z79.890 POST-MENOPAUSE ON HRT (HORMONE REPLACEMENT THERAPY): ICD-10-CM

## 2022-06-30 DIAGNOSIS — Z12.4 CERVICAL CANCER SCREENING: ICD-10-CM

## 2022-06-30 DIAGNOSIS — Z12.31 ENCOUNTER FOR SCREENING MAMMOGRAM FOR BREAST CANCER: ICD-10-CM

## 2022-06-30 DIAGNOSIS — Z12.39 ENCOUNTER FOR SCREENING BREAST EXAMINATION: ICD-10-CM

## 2022-06-30 DIAGNOSIS — R10.2 PELVIC PAIN: ICD-10-CM

## 2022-06-30 DIAGNOSIS — E28.39 PREMATURE OVARIAN FAILURE: ICD-10-CM

## 2022-06-30 DIAGNOSIS — N92.0 SPOTTING: ICD-10-CM

## 2022-06-30 DIAGNOSIS — Z01.419 ENCOUNTER FOR WELL WOMAN EXAM: Primary | ICD-10-CM

## 2022-06-30 DIAGNOSIS — Z01.419 ROUTINE GYNECOLOGICAL EXAMINATION: ICD-10-CM

## 2022-06-30 PROCEDURE — 0503F POSTPARTUM CARE VISIT: CPT | Performed by: PHYSICIAN ASSISTANT

## 2022-06-30 PROCEDURE — 99396 PREV VISIT EST AGE 40-64: CPT | Performed by: PHYSICIAN ASSISTANT

## 2022-07-07 RX ORDER — MEDROXYPROGESTERONE ACETATE 5 MG/1
5 TABLET ORAL DAILY
Qty: 90 TABLET | Refills: 4 | Status: SHIPPED | OUTPATIENT
Start: 2022-07-07

## 2022-07-07 RX ORDER — ESTRADIOL 0.5 MG/1
0.5 TABLET ORAL 3 TIMES DAILY
Qty: 270 TABLET | Refills: 3 | Status: SHIPPED | OUTPATIENT
Start: 2022-07-07

## 2022-07-07 NOTE — PROGRESS NOTES
Assessment/Plan:    No problem-specific Assessment & Plan notes found for this encounter  Diagnoses and all orders for this visit:    Encounter for well woman exam    Routine gynecological examination  -     GP Pap Dependent HPV Cotest >= 27years old    Encounter for screening breast examination    Cervical cancer screening    Premature ovarian failure  -     estradiol (ESTRACE) 0 5 MG tablet; Take 1 tablet (0 5 mg total) by mouth 3 (three) times a day    Pelvic pain  -     US pelvis complete w transvaginal; Future    Spotting  -     US pelvis complete w transvaginal; Future    Screening for HPV (human papillomavirus)  -     GP Pap Dependent HPV Cotest >= 27years old    Encounter for screening mammogram for breast cancer  -     Mammo screening bilateral w 3d & cad; Future    Post-menopause on HRT (hormone replacement therapy)  -     medroxyPROGESTERone (PROVERA) 5 mg tablet; Take 1 tablet (5 mg total) by mouth daily          Subjective:      Patient ID: Sarah Castillo is a 50 y o  female  Pt presents for her annual exam today--  She has no complaints except occ spotting and b/l cramping  H/o utis this past year  ? Bladder stone--was seen on 1 image, then gone on the next  She has no bleeding or pelvic pain in general--h/o POF  On estradiol and provera  Bowel and bladder are regular  Colonoscopy--due  No breast concerns today  Last mammo--12/21      pap today  rx us  rx mammo  Daily ca, d  Cont rx estradiol and provera      The following portions of the patient's history were reviewed and updated as appropriate: allergies, current medications, past family history, past medical history, past social history, past surgical history and problem list     Review of Systems   Constitutional: Negative for chills, fever and unexpected weight change  Gastrointestinal: Negative for abdominal pain, blood in stool, constipation and diarrhea  Genitourinary: Negative            Objective:      /70   Ht 5' 3" (1 6 m)   Wt 59 4 kg (131 lb)   LMP  (LMP Unknown)   BMI 23 21 kg/m²          Physical Exam  Vitals and nursing note reviewed  Constitutional:       Appearance: She is well-developed  HENT:      Head: Normocephalic and atraumatic  Chest:   Breasts:      Right: No inverted nipple, mass, nipple discharge or skin change  Left: No inverted nipple, mass, nipple discharge or skin change  Abdominal:      Palpations: Abdomen is soft  Genitourinary:     Exam position: Supine  Labia:         Right: No rash, tenderness or lesion  Left: No rash, tenderness or lesion  Vagina: Normal       Cervix: No cervical motion tenderness, discharge or friability  Adnexa:         Right: No mass, tenderness or fullness  Left: No mass, tenderness or fullness  Musculoskeletal:      Cervical back: Normal range of motion  Lymphadenopathy:      Lower Body: No right inguinal adenopathy  No left inguinal adenopathy

## 2022-12-18 DIAGNOSIS — Z12.31 ENCOUNTER FOR SCREENING MAMMOGRAM FOR BREAST CANCER: ICD-10-CM

## 2022-12-20 DIAGNOSIS — N60.02 BREAST CYST, LEFT: Primary | ICD-10-CM

## 2023-02-21 ENCOUNTER — APPOINTMENT (OUTPATIENT)
Dept: RADIOLOGY | Age: 53
End: 2023-02-21

## 2023-02-21 DIAGNOSIS — M54.2 CERVICALGIA: ICD-10-CM

## 2023-03-28 ENCOUNTER — HOSPITAL ENCOUNTER (OUTPATIENT)
Dept: RADIOLOGY | Age: 53
Discharge: HOME | End: 2023-03-28

## 2023-03-28 DIAGNOSIS — M54.2 CERVICALGIA: ICD-10-CM

## 2023-04-26 ENCOUNTER — OFFICE VISIT (OUTPATIENT)
Dept: PHYSICAL THERAPY | Facility: CLINIC | Age: 53
End: 2023-04-26

## 2023-04-26 DIAGNOSIS — M54.12 CERVICAL RADICULOPATHY: Primary | ICD-10-CM

## 2023-04-26 NOTE — PROGRESS NOTES
PT Evaluation     Today's date: 2023  Patient name: Sharad Hopkins  : 1970  MRN: [id number]  Referring provider: Emerson Denton PT  Dx:   Encounter Diagnosis     ICD-10-CM    1  Cervical radiculopathy  M54 12                      Assessment  Assessment details: Pt presents with s/s consistent with a lower c/s radiculopathy with a retraction DP  She will benefit from skilled PT services to address her impairments in order to decrease pain and restore function  Impairments: abnormal muscle firing, abnormal muscle tone, abnormal or restricted ROM, abnormal movement, activity intolerance, impaired physical strength, lacks appropriate home exercise program, pain with function, poor posture  and poor body mechanics  Understanding of Dx/Px/POC: good   Prognosis: good    Goals  STG - 2-4 wks  1) pt will centralize to c/s spine  2) pt will improve pain 50%    LTG - 4-8 wks  1) pt will normalize c/s ROM  2) pt will improve pain 90%    Plan  Planned therapy interventions: joint mobilization, manual therapy, body mechanics training, neuromuscular re-education, patient education, postural training, strengthening, therapeutic activities, therapeutic exercise, stretching, functional ROM exercises, flexibility and home exercise program  Frequency: 2x week  Duration in weeks: 8  Treatment plan discussed with: patient        Subjective Evaluation    History of Present Illness  Mechanism of injury: Pt is a 50 y o  female with unremarkable PMHx  She reports B UT pain radiating into the RUE down to the ring finger  She reports onset of neck pain  and RUE pain Mar '23  She denies N/T, red flags      Pain  Current pain ratin  At best pain ratin  At worst pain ratin  Location: R>L UT into lateral RUE into the ring finger  Quality: burning, sharp, radiating, tight and dull ache  Aggravating factors: sitting  Progression: no change    Social Support    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[employer])  Hand dominance: right      Diagnostic Tests  X-ray: abnormal (C5/6 DDD)  MRI studies: abnormal (C4/5 bulge, C5/6 > C6/7 DDD/DJD)  Treatments  Previous treatment: chiropractic and medication  Patient Goals  Patient goals for therapy: decreased pain, increased motion and increased strength          Objective     Postural Observations  Seated posture: poor  Standing posture: poor  Correction of posture: makes symptoms better        Active Range of Motion   Cervical/Thoracic Spine       Cervical  Subcranial protraction:  with pain   Restriction level: minimal  Subcranial retraction:  with pain   Restriction level: moderate  Flexion:  WFL and with pain  Extension:  Restriction level: moderate  Left lateral flexion:  with pain Restriction level: minimal  Right lateral flexion:  WFL  Left rotation:  with pain Restriction level: minimal  Right rotation:  with pain Restriction level: moderate  Mechanical Assessment    Cervical    Seated Protrusion: repeated movements   Pain location: no change  Pain level: produced  Seated retraction: repeated movements   Pain location: centralized  Pain intensity: better  Pain level: produced  Seated Extension: repeated movements  Pain location: no change  Pain intensity: worse  Pain level: increased    Thoracic      Lumbar      Strength/Myotome Testing   Cervical Spine     Left   Interossei strength (t1): 4  Neck lateral flexion (C3): 4-    Right   Interossei strength (t1): 4  Neck lateral flexion (C3): 4-    Left Shoulder     Planes of Motion   Flexion: 4   Abduction: 4   External rotation at 0°: 4   Internal rotation at 0°: 4     Right Shoulder     Planes of Motion   Flexion: 4-   Abduction: 4-   External rotation at 0°: 4-   Internal rotation at 0°: 4     Left Elbow   Flexion: 4  Extension: 4    Right Elbow   Flexion: 4  Extension: 4    Left Wrist/Hand   Wrist extension: 4  Wrist flexion: 4  Thumb extension: 4    Right Wrist/Hand   Wrist extension: 4  Wrist flexion: 4  Thumb extension: 4    Additional Strength Details  : R: 45 lbs, L: 60 lbs  Pt is R-hand dominant             Precautions: none  Dx: lower c/s radiculopathy with a retraction DP    Manuals 4/26            Graston suboccipitals                                                    Neuro Re-Ed             Seated postural correction and education 8 min            C/s retraction 2x10            C/s retraction OP 1x10            C/s retraction/  extension 1x10            Seated t/s extension over chair             Prone thoracic extension                          Ther Ex                                                                                                                     Ther Activity                                       Gait Training                                       Modalities

## 2023-05-03 ENCOUNTER — OFFICE VISIT (OUTPATIENT)
Dept: PHYSICAL THERAPY | Facility: CLINIC | Age: 53
End: 2023-05-03

## 2023-05-03 DIAGNOSIS — M54.12 CERVICAL RADICULOPATHY: Primary | ICD-10-CM

## 2023-05-03 NOTE — PROGRESS NOTES
Daily Note     Today's date: 5/3/2023  Patient name: Bigg Regalado  : 1970  MRN: [id number]  Referring provider: Hussein Zavala PT  Dx:   Encounter Diagnosis     ICD-10-CM    1  Cervical radiculopathy  M54 12                      Subjective: Pt reports good compliance with HEP 5xD, notes partial relief when performing  Objective: See treatment diary below  Retraction OP: peripheralized  Retraction/R SB: centralized    Assessment: Pt demonstrated a relevant lateral component and R SB DP  Plan: Assess response to HEP changes nv          Precautions: none  Dx: lower c/s radiculopathy with a retraction DP    Manuals 4/26 5/3           Graston suboccipitals  5 min                                                  Neuro Re-Ed             Seated postural correction and education 8 min 2 min           C/s retraction 2x10 3x10           C/s retraction/R SB  3x10           C/s retraction OP 1x10            C/s retraction/  extension 1x10            Seated t/s extension over chair  1x10 3x10          Prone thoracic extension  2x10 3x10                       Ther Ex                                                                                                                     Ther Activity                                       Gait Training                                       Modalities

## 2023-05-08 ENCOUNTER — OFFICE VISIT (OUTPATIENT)
Dept: PHYSICAL THERAPY | Facility: CLINIC | Age: 53
End: 2023-05-08

## 2023-05-08 DIAGNOSIS — M54.12 CERVICAL RADICULOPATHY: Primary | ICD-10-CM

## 2023-05-08 NOTE — PROGRESS NOTES
Daily Note     Today's date: 2023  Patient name: Duarte Barlow  : 1970  MRN: [id number]  Referring provider: Tati Sullivan PT  Dx:   Encounter Diagnosis     ICD-10-CM    1  Cervical radiculopathy  M54 12                      Subjective: Pt reports good response to R SB POC but notes increased RUE and R UT pain after doing yoga and an aerobics class over the weekend  Objective: See treatment diary below    Assessment: Pt demonstrated improved c/s retraction ROM following R SB but is not ready to return to sagittal plane until RUE symptoms resolve  Plan: Cont  POC for R SB          Precautions: none  Dx: lower c/s radiculopathy with a retraction DP    Manuals 4/26 5/3 5/8          Graston suboccipitals  5 min 5 min                                                 Neuro Re-Ed             Seated postural correction and education 8 min 2 min           C/s retraction 2x10 3x10           C/s retraction/R SB  3x10 5x10          C/s retraction OP 1x10            C/s retraction/  extension 1x10            Seated t/s extension over chair  1x10 3x10          Prone thoracic extension  2x10 2x10 3x10                      Ther Ex                                                                                                                     Ther Activity                                       Gait Training                                       Modalities

## 2023-05-10 ENCOUNTER — OFFICE VISIT (OUTPATIENT)
Dept: PHYSICAL THERAPY | Facility: CLINIC | Age: 53
End: 2023-05-10

## 2023-05-10 DIAGNOSIS — M54.12 CERVICAL RADICULOPATHY: Primary | ICD-10-CM

## 2023-05-10 NOTE — PROGRESS NOTES
"Daily Note     Today's date: 5/10/2023  Patient name: Sherry Galvin  : 1970  MRN: [id number]  Referring provider: Daniel Brennan, PT  Dx:   Encounter Diagnosis     ICD-10-CM    1  Cervical radiculopathy  M54 12                      Subjective: Pt reports having a rough night and first thing this morning, notes B UT and lateral R upper arm pain and inability to improve with HEP  She reports no neck or RUE pain currently but does admit to having an occipital h/a     Objective: See treatment diary below  OP retraction/R SB: NE    Assessment: Pt demonstrated good tolerance to R SB OP and able to resolve h/a with self-SNAGs  Plan: Assess response nv          Precautions: none  Dx: lower c/s radiculopathy with a retraction/R SB DP    Manuals 4/26 5/3 5          Graston suboccipitals  5 min 5 min + R UT  7 min         Seated h/a C2 SNAGs    30\"x1                                   Neuro Re-Ed             Seated postural correction and education 8 min 2 min 2 min 2 min         C/s retraction 2x10 3x10           C/s retraction/R SB  3x10 5x10 5x10         C/s retraction OP 1x10            C/s retraction/  extension 1x10            Seated t/s extension over chair  1x10 3x10 3x10         Prone thoracic extension  2x10 2x10 nv 3x10        Seated h/a self SNAGs    30\"x3                      Ther Ex                                                                                                                     Ther Activity                                       Gait Training                                       Modalities                                            "

## 2023-05-15 ENCOUNTER — OFFICE VISIT (OUTPATIENT)
Dept: PHYSICAL THERAPY | Facility: CLINIC | Age: 53
End: 2023-05-15

## 2023-05-15 DIAGNOSIS — M54.12 CERVICAL RADICULOPATHY: Primary | ICD-10-CM

## 2023-05-15 NOTE — PROGRESS NOTES
"Daily Note     Today's date: 5/15/2023  Patient name: Nellie Blanchard  : 1970  MRN: [id number]  Referring provider: Sawyer Garay, PT  Dx:   Encounter Diagnosis     ICD-10-CM    1  Cervical radiculopathy  M54 12                      Subjective: Pt reports having 1 day since last visit when she had a lot of pain but was able to improve with R SB in supine vs standing  Objective: See treatment diary below  R SG: improved  R SG PT OP: no change  C/s retraction/extension: centralized, improved R shoulder FE ROM > mechanics    Assessment: Pt demonstrated an exhaustion of the frontal plane and was able to centralize and improve extension ROM with repeated c/s retraction/extension  Plan: Assess response to return to sagittal plane nv          Precautions: none  Dx: lower c/s radiculopathy with a retraction/R SB DP    Manuals  5/3 5/8 5/10 5/15        Graston suboccipitals  5 min 5 min + R UT  7 min 5 min        Seated h/a C2 SNAGs    30\"x1         Manual c/s retraction/R SB off table     1x10                     Neuro Re-Ed             Seated postural correction and education 8 min 2 min 2 min 2 min         C/s retraction 2x10 3x10           C/s retraction/R SB  3x10 5x10 5x10 3x10  3x10 OP        C/s retraction OP 1x10            C/s retraction/  extension 1x10    3x10        Seated t/s extension over chair  1x10 3x10 3x10 3x10        Prone thoracic extension  2x10 2x10 nv 3x10        Seated h/a self SNAGs    30\"x3 HEP                     Ther Ex                                                                                                                     Ther Activity                                       Gait Training                                       Modalities                                            "

## 2023-05-18 ENCOUNTER — OFFICE VISIT (OUTPATIENT)
Dept: PHYSICAL THERAPY | Facility: CLINIC | Age: 53
End: 2023-05-18

## 2023-05-18 DIAGNOSIS — M54.12 CERVICAL RADICULOPATHY: Primary | ICD-10-CM

## 2023-05-18 NOTE — PROGRESS NOTES
"Daily Note     Today's date: 2023  Patient name: Alm Mcburney  : 1970  MRN: [id number]  Referring provider: Harriet Sullivan, PT  Dx:   Encounter Diagnosis     ICD-10-CM    1  Cervical radiculopathy  M54 12                      Subjective: Pt reports increased B upper and mid c/s pain x 36 hrs following last visit but now has no R UT or shoulder pain  Objective: See treatment diary below  PA assessment:  C4*    Assessment: Pt demonstrated improved pain and ROM following C4 PA mobs and pain-free extension end ROM with towel SNAGs but unable to maintain without  Instructed pt to perform c/s extension 1x10 3xD and progress to 5xD but use towel SNAG if unable to perform pain-free  Plan: Cont  POC for sagittal plane as tolerated          Precautions: none  Dx: lower c/s radiculopathy with an extension DP    Manuals  5/3 5/8 5/10 5/15 5/18       Graston suboccipitals  5 min 5 min + R UT  7 min 5 min 5 min       Prone PA mobs C2-7      1x30 ea  C4   2x50       Seated h/a C2 SNAGs    30\"x1         Manual c/s retraction/R SB off table     1x10                     Neuro Re-Ed             Seated postural correction and education 8 min 2 min 2 min 2 min         C/s retraction 2x10 3x10    1x10       C/s retraction/R SB  3x10 5x10 5x10 3x10  3x10 OP        C/s retraction OP 1x10            C/s retraction/  extension 1x10    3x10 1x5       C/s extension C4 SNAGs      1x10       Seated t/s extension over chair  1x10 3x10 3x10 3x10 3x10       Prone thoracic extension  2x10 2x10 nv 3x10 3x10 3x15      Seated h/a self SNAGs    30\"x3 HEP        TB rows      G/  3x10                                 Ther Ex                                                                                                                     Ther Activity                                       Gait Training                                       Modalities                                            "

## 2023-05-23 ENCOUNTER — OFFICE VISIT (OUTPATIENT)
Dept: PHYSICAL THERAPY | Facility: CLINIC | Age: 53
End: 2023-05-23

## 2023-05-23 DIAGNOSIS — M54.12 CERVICAL RADICULOPATHY: Primary | ICD-10-CM

## 2023-05-23 NOTE — PROGRESS NOTES
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  Objective: See treatment diary below    Assessment:  Centralized with supine distraction, retraction, ext, Rot  Pt has limited retraction ROM and is protruding with ext  Plan: Cont  POC          Precautions: none  Dx: lower c/s radiculopathy with an extension DP    Manuals 4/26 5/3 5/8 5/10 5/15 5/18 5/23      Graston suboccipitals  5 min 5 min + R UT  7 min 5 min 5 min       Prone PA mobs C4      1x30 ea  C4   2x50 1x30      Seated h/a C2 SNAGs    30\"x1         Manual c/s retraction/R SB off table     1x10        Supine c/s retraction mob       2x10      Supine distraction, retraction, ext       2x5      Neuro Re-Ed             Seated postural correction and education 8 min 2 min 2 min 2 min         C/s retraction 2x10 3x10    1x10 1x20      C/s retraction/R SB  3x10 5x10 5x10 3x10  3x10 OP        C/s retraction OP 1x10      2x10      C/s retraction/  extension 1x10    3x10 1x5 2x10*      Supine c/s retraction, ext with towel support       2x10      C/s extension C4 SNAGs      1x10       Seated t/s extension over chair  1x10 3x10 3x10 3x10 3x10       Prone thoracic extension  2x10 2x10 nv 3x10 3x10 3x15      Seated h/a self SNAGs    30\"x3 HEP        TB rows      G/  3x10                                 Ther Ex                                                                                                                     Ther Activity                                       Gait Training                                       Modalities                                            "

## 2023-05-25 ENCOUNTER — OFFICE VISIT (OUTPATIENT)
Dept: PHYSICAL THERAPY | Facility: CLINIC | Age: 53
End: 2023-05-25

## 2023-05-25 DIAGNOSIS — M54.12 CERVICAL RADICULOPATHY: Primary | ICD-10-CM

## 2023-05-25 NOTE — PROGRESS NOTES
"Daily Note     Today's date: 2023  Patient name: Jacoby Post  : 1970  MRN: [id number]  Referring provider: Suri Weinstein, PT  Dx:   Encounter Diagnosis     ICD-10-CM    1  Cervical radiculopathy  M54 12                      Subjective: Pt reports no pain currently and overall improved since last visit  Objective: See treatment diary below    Assessment:  Pt remained abolished with c/s retraction but AROM extension continues to be painful and blocked  Pt instructed to add supine retraction/extension with towel to HEP  Plan: Cont  POC          Precautions: none  Dx: lower c/s radiculopathy with an extension DP    Manuals  5/3 5 5/10 5/15 5/18 5/23 5/25     Graston suboccipitals  5 min 5 min + R UT  7 min 5 min 5 min       Prone PA mobs C4      1x30 ea  C4   2x50 1x30 C2-3, 5-7  1x30     Seated h/a C2 SNAGs    30\"x1         Manual c/s retraction/R SB off table     1x10        Supine c/s retraction mob       2x10 1x20     Supine distraction, retraction, ext       2x5 2x10     Neuro Re-Ed             Seated postural correction and education 8 min 2 min 2 min 2 min         C/s retraction 2x10 3x10    1x10 1x20 3x10     C/s retraction/R SB  3x10 5x10 5x10 3x10  3x10 OP        C/s retraction OP 1x10      2x10 3x10     C/s retraction/  extension 1x10    3x10 1x5 2x10* 1x10     Supine c/s retraction, ext with towel support       2x10 1x10     C/s extension C4 SNAGs      1x10       Seated t/s extension over chair  1x10 3x10 3x10 3x10 3x10       Prone thoracic extension  2x10 2x10 nv 3x10 3x10 3x15 3x15     Seated h/a self SNAGs    30\"x3 HEP        TB rows      G/  3x10  G/  3x15                               Ther Ex                                                                                                                     Ther Activity                                       Gait Training                                       Modalities                                            "

## 2023-06-01 ENCOUNTER — OFFICE VISIT (OUTPATIENT)
Dept: PHYSICAL THERAPY | Facility: CLINIC | Age: 53
End: 2023-06-01

## 2023-06-01 DIAGNOSIS — M54.12 CERVICAL RADICULOPATHY: Primary | ICD-10-CM

## 2023-06-01 NOTE — PROGRESS NOTES
"Daily Note     Today's date: 2023  Patient name: León Clark  : 1970  MRN: [id number]  Referring provider: Hannah Vitale PT  Dx:   Encounter Diagnosis     ICD-10-CM    1  Cervical radiculopathy  M54 12                      Subjective: Pt reports feeling good but still has pain and difficulty with end ROM extension  Objective: See treatment diary below  Repeated extension: produced, no worse    Assessment:  Pt demonstrated improved c/s extension ROM and arthrokinematics  Plan: Cont  POC  Assess response to c/s extension 1x10 1xD          Precautions: none  Dx: lower c/s radiculopathy with an extension DP    Manuals  5/3 5/ 5/10 5/15 5/18 5/23 5/25 6    Graston suboccipitals  5 min 5 min + R UT  7 min 5 min 5 min       Prone PA mobs C4      1x30 ea  C4   2x50 1x30 C2-3, 5-7  1x30 C7-T5  1x20 ea    Seated h/a C2 SNAGs    30\"x1         Manual c/s retraction/R SB off table     1x10        Supine c/s retraction mob       2x10 1x20     Supine distraction, retraction, ext       2x5 2x10 1x10    Neuro Re-Ed             Seated postural correction and education 8 min 2 min 2 min 2 min         C/s retraction 2x10 3x10    1x10 1x20 3x10 3x10    C/s retraction/R SB  3x10 5x10 5x10 3x10  3x10 OP        C/s retraction OP 1x10      2x10 3x10 1x10    C/s retraction/  extension 1x10    3x10 1x5 2x10* 1x10 1x10    Supine c/s retraction, ext with towel support       2x10 1x10     C/s extension C4 SNAGs      1x10       Seated t/s extension over chair  1x10 3x10 3x10 3x10 3x10       Prone thoracic extension  2x10 2x10 nv 3x10 3x10 3x15 3x15 3x15    Seated h/a self SNAGs    30\"x3 HEP        TB rows      G/  3x10  G/  3x15 G/  3x15                              Ther Ex                                                                                                                     Ther Activity                                       Gait Training                                       Modalities                   "

## 2023-06-05 ENCOUNTER — APPOINTMENT (OUTPATIENT)
Dept: PHYSICAL THERAPY | Facility: CLINIC | Age: 53
End: 2023-06-05
Payer: COMMERCIAL

## 2023-06-07 ENCOUNTER — APPOINTMENT (OUTPATIENT)
Dept: PHYSICAL THERAPY | Facility: CLINIC | Age: 53
End: 2023-06-07
Payer: COMMERCIAL

## 2023-06-08 ENCOUNTER — OFFICE VISIT (OUTPATIENT)
Dept: PHYSICAL THERAPY | Facility: CLINIC | Age: 53
End: 2023-06-08
Payer: COMMERCIAL

## 2023-06-08 DIAGNOSIS — M54.12 CERVICAL RADICULOPATHY: Primary | ICD-10-CM

## 2023-06-08 PROCEDURE — 97112 NEUROMUSCULAR REEDUCATION: CPT | Performed by: PHYSICAL THERAPIST

## 2023-06-08 PROCEDURE — 97140 MANUAL THERAPY 1/> REGIONS: CPT | Performed by: PHYSICAL THERAPIST

## 2023-06-08 NOTE — PROGRESS NOTES
"Daily Note     Today's date: 2023  Patient name: Adalberto Banks  : 1970  MRN: [id number]  Referring provider: Johnny Sun, PT  Dx:   Encounter Diagnosis     ICD-10-CM    1  Cervical radiculopathy  M54 12                      Subjective: Pt reports lower c/s pain with end ROM extension but no worsening with repeated c/s extension 1x10 1xD  Objective: See treatment diary below  Repeated extension:   1x10: produced, no worse  2x10: produced, worse    Assessment:  Pt demonstrated no further improvement in extension ROM, poor response to additional repetitions  Pt demonstrated improved pain and ROM during C5/6 SNAGs and after Graston B LS, UT  Plan: Assess response to c/s extension 1x10 3xD  Progress SNAGs and Graston nv          Precautions: none  Dx: lower c/s radiculopathy with an extension DP    Manuals  5/3 5/8 5/10 515 5 5 6/ 6/   Graston suboccipitals  5 min 5 min + R UT  7 min 5 min 5 min       Prone PA mobs C4      1x30 ea  C4   2x50 1x30 C2-3, 5-7  1x30 C7-T5  1x20 ea    Graston B LS, UT          4 min   Seated C5/6 extension SNAGs          1x10   Seated h/a C2 SNAGs    30\"x1         Manual c/s retraction/R SB off table     1x10        Supine c/s retraction mob       2x10 1x20  3x10   Supine distraction, retraction, ext       2x5 2x10 1x10 2x10   Neuro Re-Ed             Seated postural correction and education 8 min 2 min 2 min 2 min         C/s retraction 2x10 3x10    1x10 1x20 3x10 3x10 1x10   C/s retraction/R SB  3x10 5x10 5x10 3x10  3x10 OP        C/s retraction OP 1x10      2x10 3x10 1x10 3x10   C/s retraction/  extension 1x10    3x10 1x5 2x10* 1x10 1x10 2x10   Supine c/s retraction, ext with towel support       2x10 1x10     C/s extension C4 SNAGs      1x10       Seated t/s extension over chair  1x10 3x10 3x10 3x10 3x10       Prone thoracic extension  2x10 2x10 nv 3x10 3x10 3x15 3x15 3x15 4x12   Seated h/a self SNAGs    30\"x3 HEP        TB rows      G/  3x10  G/  3x15 " G/  3x15 G/  3x20                             Ther Ex                                                                                                                     Ther Activity                                       Gait Training                                       Modalities

## 2023-06-09 ENCOUNTER — APPOINTMENT (OUTPATIENT)
Dept: LAB | Age: 53
End: 2023-06-09
Payer: COMMERCIAL

## 2023-06-09 DIAGNOSIS — M75.41 IMPINGEMENT SYNDROME OF RIGHT SHOULDER: ICD-10-CM

## 2023-06-09 LAB
ANA SER QL IA: NEGATIVE
CRP SERPL QL: <3 MG/L
ERYTHROCYTE [SEDIMENTATION RATE] IN BLOOD: 31 MM/HOUR (ref 0–29)

## 2023-06-09 PROCEDURE — 86430 RHEUMATOID FACTOR TEST QUAL: CPT

## 2023-06-09 PROCEDURE — 86038 ANTINUCLEAR ANTIBODIES: CPT

## 2023-06-09 PROCEDURE — 36415 COLL VENOUS BLD VENIPUNCTURE: CPT

## 2023-06-09 PROCEDURE — 86140 C-REACTIVE PROTEIN: CPT

## 2023-06-09 PROCEDURE — 85652 RBC SED RATE AUTOMATED: CPT

## 2023-06-11 LAB — RHEUMATOID FACT SER QL LA: NEGATIVE

## 2023-06-12 ENCOUNTER — OFFICE VISIT (OUTPATIENT)
Dept: PHYSICAL THERAPY | Facility: CLINIC | Age: 53
End: 2023-06-12
Payer: COMMERCIAL

## 2023-06-12 DIAGNOSIS — M54.12 CERVICAL RADICULOPATHY: Primary | ICD-10-CM

## 2023-06-12 PROCEDURE — 97112 NEUROMUSCULAR REEDUCATION: CPT | Performed by: PHYSICAL THERAPIST

## 2023-06-12 NOTE — PROGRESS NOTES
Chest pain- 3/11/23 ER visit   Still occurring on and off     Health Maintenance Due   Topic Date Due   • Hepatitis B Vaccine (1 of 3 - 3-dose series) Never done   • COVID-19 Vaccine (1) Never done   • Pneumococcal Vaccine 0-64 (1 - PCV) Never done   • Hepatitis C Screening  Never done   • DTaP/Tdap/Td Vaccine (7 - Td or Tdap) 01/09/2022       Patient is due for topics as listed above but is not proceeding with Immunization(s) COVID-19, Dtap/Tdap/Td, Hep B and Pneumococcal at this time.     Daily Note     Today's date: 2023  Patient name: Abe Sesay  : 1970  MRN: [id number]  Referring provider: Silvana Sadler PT  Dx:   Encounter Diagnosis     ICD-10-CM    1  Cervical radiculopathy  M54 12                      Subjective: Pt reports feeling really sore x 48 hrs following last visit f/b further improvement in c/s pain > ROM since then  Objective: See treatment diary below    Assessment: Pt demonstrated improved c/s extension ROM with much less following modified tx intensity  Plan: Assess response nv       Precautions: none  Dx: lower c/s radiculopathy with an extension DP    Manuals          6   Graston suboccipitals             Prone PA mobs C4             Graston B LS, UT 8 min         4 min   Seated C3/4 extension SNAGs 2x10         1x10   Seated h/a C2 SNAGs             Manual c/s retraction/R SB off table             Supine c/s retraction mob 1x10         3x10   Supine distraction, retraction, ext held         2x10   Neuro Re-Ed             Seated postural correction and education             C/s retraction          1x10   C/s retraction/R SB             C/s retraction OP 3x10         3x10   C/s retraction/  extension 1x10         2x10   Supine c/s retraction, ext with towel support held            C/s extension C4 SNAGs             Seated t/s extension over chair 1x10            Prone thoracic extension 4x12         4x12   Seated h/a self SNAGs             TB rows G/  3x20         G/  3x20                             Ther Ex                                                                                                                     Ther Activity                                       Gait Training                                       Modalities

## 2023-06-14 ENCOUNTER — OFFICE VISIT (OUTPATIENT)
Dept: PHYSICAL THERAPY | Facility: CLINIC | Age: 53
End: 2023-06-14
Payer: COMMERCIAL

## 2023-06-14 DIAGNOSIS — M54.12 CERVICAL RADICULOPATHY: Primary | ICD-10-CM

## 2023-06-14 PROCEDURE — 97112 NEUROMUSCULAR REEDUCATION: CPT | Performed by: PHYSICAL THERAPIST

## 2023-06-14 PROCEDURE — 97140 MANUAL THERAPY 1/> REGIONS: CPT | Performed by: PHYSICAL THERAPIST

## 2023-06-14 NOTE — PROGRESS NOTES
Daily Note     Today's date: 2023  Patient name: Ángel Vann  : 1970  MRN: [id number]  Referring provider: Kvng Fernandez, PT  Dx:   Encounter Diagnosis     ICD-10-CM    1  Cervical radiculopathy  M54 12                      Subjective: Pt reports lower c/s pain late Monday into Tuesday following last visit but much less intense than after the previous visit  She also reports she still has mild pain radiating from R shoulder into the ring > little finger intermittently but unable to abolish with repeated c/s retraction or extension  Objective: See treatment diary below  Held c/s extension ROM, HEP  (+) ULTT 3 on R  Added R ulnar nerve glides 1x10 1xD, c/s retraction 5x10 5xD to HEP    Assessment: Pt demonstrated a retraction DP and significant R ulnar nerve tension  Plan: Assess response nv in 1 wk after return from vacation       Precautions: none  Dx: lower c/s radiculopathy with an extension DP    Manuals         6/8   Graston suboccipitals  3 min           Prone PA mobs C4             Graston B LS, UT 8 min 5 min        4 min   Seated C3/4 extension SNAGs 2x10         1x10   Seated h/a C2 SNAGs             Manual c/s retraction/R SB off table             Supine c/s retraction mob 1x10 3x10        3x10   Supine distraction, retraction, ext held         2x10   Neuro Re-Ed             Seated postural correction and education             C/s retraction  1x10  1x10        1x10   R ulnar nerve glides  1x10           C/s retraction OP 3x10 3x10        3x10   C/s retraction/  extension 1x10         2x10   Supine c/s retraction, ext with towel support held            C/s extension C4 SNAGs             Seated t/s extension over chair 1x10 1x10           Prone thoracic extension 4x12 3x20        4x12   Seated h/a self SNAGs             TB rows G/  3x20 Blue  3x15        G/  3x20                             Ther Ex Ther Activity                                       Gait Training                                       Modalities

## 2023-06-28 ENCOUNTER — OFFICE VISIT (OUTPATIENT)
Dept: PHYSICAL THERAPY | Facility: CLINIC | Age: 53
End: 2023-06-28
Payer: COMMERCIAL

## 2023-06-28 DIAGNOSIS — M25.511 CHRONIC RIGHT SHOULDER PAIN: ICD-10-CM

## 2023-06-28 DIAGNOSIS — G89.29 CHRONIC RIGHT SHOULDER PAIN: ICD-10-CM

## 2023-06-28 DIAGNOSIS — M54.12 CERVICAL RADICULOPATHY: Primary | ICD-10-CM

## 2023-06-28 PROCEDURE — 97140 MANUAL THERAPY 1/> REGIONS: CPT | Performed by: PHYSICAL THERAPIST

## 2023-06-28 PROCEDURE — 97112 NEUROMUSCULAR REEDUCATION: CPT | Performed by: PHYSICAL THERAPIST

## 2023-06-28 NOTE — PROGRESS NOTES
"Daily Note     Today's date: 2023  Patient name: So Jeffrey  : 1970  MRN: [id number]  Referring provider: Skyla Bautista PT  Dx:   Encounter Diagnosis     ICD-10-CM    1  Cervical radiculopathy  M54 12       2  Chronic right shoulder pain  M25 511     G89 29                      Subjective: Pt returns to PT after a 2-wk absence  She reports a resolution of neck pain with c/s retraction until she had a massage 2 days ago but is now feeling better  She continues to have up to 8/10 R lateral shoulder and upper arm pain with functional reaching, 0/10 pain at rest     Objective: See treatment diary below  AROM: FF: 100 deg, ABD: 100 deg, ER: ear, IR: L4  Moderate scapular elevation with > 90 deg FE  MMT: 3+/5 ER  (-) ER lag, drop arm, (+) gomez    Assessment: Pt presents with s/s consistent with R subacromial impingement  Pt demonstrated mildly improved active FE following MWM  Plan: Assess response nv       Precautions: none  Dx: lower c/s radiculopathy with a retraction DP, R subacromial impingement    Manuals        68   Graston suboccipitals  3 min           Prone PA mobs C4             Graston B LS, UT 8 min 5 min        4 min   Seated C3/4 extension SNAGs 2x10         1x10   FF MWM   1x5 1x10         Laser R subacromial space   4000J          Supine c/s retraction mob 1x10 3x10 3x10       3x10   Supine distraction, retraction, ext held         2x10   Neuro Re-Ed             Seated postural correction and education             C/s retraction  1x10  1x10 3x10       1x10   R ulnar nerve glides  1x10           R ulnar nerve flossing   1x10 3x10         C/s retraction OP 3x10 3x10 3x10       3x10   DNF    3\"x5         C/s retraction/  extension 1x10         2x10   Supine c/s retraction, ext with towel support held            C/s extension C4 SNAGs             Seated t/s extension over chair 1x10 1x10           Prone thoracic extension 4x12 3x20 3x20       4x12   Seated h/a self SNAGs " TB rows G/  3x20 Blue  3x15 Black  3x10       G/  3x20   scaption AAROM   1x10                       Ther Ex             TB ER   Y/  3x10                                                                                                     Ther Activity                                       Gait Training                                       Modalities

## 2023-06-30 ENCOUNTER — OFFICE VISIT (OUTPATIENT)
Dept: PHYSICAL THERAPY | Facility: CLINIC | Age: 53
End: 2023-06-30
Payer: COMMERCIAL

## 2023-06-30 DIAGNOSIS — M25.511 CHRONIC RIGHT SHOULDER PAIN: ICD-10-CM

## 2023-06-30 DIAGNOSIS — M54.12 CERVICAL RADICULOPATHY: Primary | ICD-10-CM

## 2023-06-30 DIAGNOSIS — G89.29 CHRONIC RIGHT SHOULDER PAIN: ICD-10-CM

## 2023-06-30 PROCEDURE — 97112 NEUROMUSCULAR REEDUCATION: CPT | Performed by: PHYSICAL THERAPIST

## 2023-06-30 PROCEDURE — 97140 MANUAL THERAPY 1/> REGIONS: CPT | Performed by: PHYSICAL THERAPIST

## 2023-06-30 NOTE — PROGRESS NOTES
"Daily Note     Today's date: 2023  Patient name: Barb Virk  : 1970  MRN: [id number]  Referring provider: Bucky Madrid PT  Dx:   Encounter Diagnosis     ICD-10-CM    1  Cervical radiculopathy  M54 12       2  Chronic right shoulder pain  M25 511     G89 29                      Subjective: Pt returns to PT after a 2-wk absence  She reports a resolution of neck pain with c/s retraction until she had a massage 2 days ago but is now feeling better  She continues to have up to 8/10 R lateral shoulder and upper arm pain with functional reaching, 0/10 pain at rest     Objective: See treatment diary below  AROM: FF: 100 deg, ABD: 100 deg, ER: ear, IR: L4  Moderate scapular elevation with > 90 deg FE  MMT: 3+/5 ER  (-) ER lag, drop arm, (+) gomez    Assessment: Pt presents with s/s consistent with R subacromial impingement  Pt demonstrated mildly improved active FE following MWM  Plan: Assess response nv       Precautions: none  Dx: lower c/s radiculopathy with a retraction DP, R subacromial impingement    Manuals          Graston suboccipitals  3 min                        Graston B LS, UT 8 min 5 min           Seated C3/4 extension SNAGs 2x10            R shoulder PROM    10\"x5 ea         FF MWM   1x5 nv 1x10        Laser R subacromial space   4000J 4000J         Supine c/s retraction mob 1x10 3x10 3x10 np         Supine distraction, retraction, ext held            Neuro Re-Ed             Seated postural correction and education             C/s retraction  1x10  1x10 3x10 3x10         R ulnar nerve glides  1x10           R ulnar nerve flossing   1x10 3x10         C/s retraction OP 3x10 3x10 3x10 3x10         DNF    3\"x5                                                Seated t/s extension over chair 1x10 1x10           Prone thoracic extension 4x12 3x20 3x20 3x20         Seated h/a self SNAGs             TB rows G/  3x20 Blue  3x15 Black  3x10 Black  3x15         scaption AAROM   " 1x10 1x10                      Ther Ex             TB ER   Y/  3x10 nv Y/  3x15                                                                                                   Ther Activity                                       Gait Training                                       Modalities

## 2023-07-05 ENCOUNTER — OFFICE VISIT (OUTPATIENT)
Dept: PHYSICAL THERAPY | Facility: CLINIC | Age: 53
End: 2023-07-05
Payer: COMMERCIAL

## 2023-07-05 ENCOUNTER — ANNUAL EXAM (OUTPATIENT)
Dept: GYNECOLOGY | Facility: CLINIC | Age: 53
End: 2023-07-05
Payer: COMMERCIAL

## 2023-07-05 VITALS
BODY MASS INDEX: 23.74 KG/M2 | WEIGHT: 134 LBS | HEIGHT: 63 IN | SYSTOLIC BLOOD PRESSURE: 124 MMHG | DIASTOLIC BLOOD PRESSURE: 80 MMHG

## 2023-07-05 DIAGNOSIS — Z01.419 ENCOUNTER FOR WELL WOMAN EXAM: Primary | ICD-10-CM

## 2023-07-05 DIAGNOSIS — E28.39 PREMATURE OVARIAN FAILURE: ICD-10-CM

## 2023-07-05 DIAGNOSIS — Z12.39 ENCOUNTER FOR SCREENING BREAST EXAMINATION: ICD-10-CM

## 2023-07-05 DIAGNOSIS — N60.02 BREAST CYST, LEFT: ICD-10-CM

## 2023-07-05 DIAGNOSIS — M54.12 CERVICAL RADICULOPATHY: Primary | ICD-10-CM

## 2023-07-05 DIAGNOSIS — Z12.31 ENCOUNTER FOR SCREENING MAMMOGRAM FOR MALIGNANT NEOPLASM OF BREAST: ICD-10-CM

## 2023-07-05 DIAGNOSIS — M25.511 CHRONIC RIGHT SHOULDER PAIN: ICD-10-CM

## 2023-07-05 DIAGNOSIS — G89.29 CHRONIC RIGHT SHOULDER PAIN: ICD-10-CM

## 2023-07-05 DIAGNOSIS — Z79.890 POST-MENOPAUSE ON HRT (HORMONE REPLACEMENT THERAPY): ICD-10-CM

## 2023-07-05 PROCEDURE — 97112 NEUROMUSCULAR REEDUCATION: CPT | Performed by: PHYSICAL THERAPIST

## 2023-07-05 PROCEDURE — 97140 MANUAL THERAPY 1/> REGIONS: CPT | Performed by: PHYSICAL THERAPIST

## 2023-07-05 PROCEDURE — 99396 PREV VISIT EST AGE 40-64: CPT | Performed by: PHYSICIAN ASSISTANT

## 2023-07-05 RX ORDER — ESTRADIOL 0.5 MG/1
0.5 TABLET ORAL 3 TIMES DAILY
Qty: 270 TABLET | Refills: 4 | Status: SHIPPED | OUTPATIENT
Start: 2023-07-05

## 2023-07-05 RX ORDER — MEDROXYPROGESTERONE ACETATE 5 MG/1
5 TABLET ORAL DAILY
Qty: 90 TABLET | Refills: 4 | Status: SHIPPED | OUTPATIENT
Start: 2023-07-05

## 2023-07-05 RX ORDER — CELECOXIB 200 MG/1
200 CAPSULE ORAL DAILY
COMMUNITY
Start: 2023-06-28

## 2023-07-05 NOTE — PROGRESS NOTES
Daily Note     Today's date: 2023  Patient name: Tamar Winkler  : 1970  MRN: [id number]  Referring provider: Dolly Rice, PT  Dx:   Encounter Diagnosis     ICD-10-CM    1. Cervical radiculopathy  M54.12       2. Chronic right shoulder pain  M25.511     G89.29                      Subjective: Pt reports improved R shoulder intermittently. Objective: See treatment diary below    Assessment: Pt demonstrated improved FE following MWM and table slides. Added table slides to HEP and withdrew standing scaption AAROM. Plan: Cont. POC.      Precautions: none  Dx: lower c/s radiculopathy with a retraction DP, R subacromial impingement    Manuals         Graston suboccipitals  3 min                        Graston B LS, UT 8 min 5 min           Seated C3/4 extension SNAGs 2x10            R shoulder PROM    10"x5 ea 10"x6 ea        FF MWM   1x5 nv 1x10        Laser R subacromial space   4000J 4000J 4000J        Supine c/s retraction mob 1x10 3x10 3x10 np         Supine distraction, retraction, ext held            Neuro Re-Ed             Seated postural correction and education             C/s retraction  1x10  1x10 3x10 3x10 3x10        R ulnar nerve glides  1x10           R ulnar nerve flossing   1x10 3x10 modified  1x10 modified  3x10       C/s retraction OP 3x10 3x10 3x10 3x10 3x10        DNF    3"x5 3"x5 3"x5 3"x10                                             Seated t/s extension over chair 1x10 1x10           Prone thoracic extension 4x12 3x20 3x20 3x20 HEP        Prone T's             TB rows G/  3x20 Blue  3x15 Black  3x10 Black  3x15 Black  3x15 Black  3x20       scaption AAROM   1x10 1x10 1x10        Table slides     2x10                                  Ther Ex             TB ER   Y/  3x10 nv Y/  3x15                                                                                                   Ther Activity                                       Gait Training Modalities

## 2023-07-06 NOTE — PROGRESS NOTES
Assessment/Plan:    No problem-specific Assessment & Plan notes found for this encounter. Diagnoses and all orders for this visit:    Encounter for well woman exam    Encounter for screening breast examination    Premature ovarian failure  -     estradiol (ESTRACE) 0.5 MG tablet; Take 1 tablet (0.5 mg total) by mouth 3 (three) times a day    Breast cyst, left  -     Mammo screening bilateral w 3d & cad; Future  -     US breast left limited (diagnostic); Future    Encounter for screening mammogram for malignant neoplasm of breast  -     Mammo screening bilateral w 3d & cad; Future  -     US breast left limited (diagnostic); Future    Post-menopause on HRT (hormone replacement therapy)  -     medroxyPROGESTERone (PROVERA) 5 mg tablet; Take 1 tablet (5 mg total) by mouth daily    Other orders  -     celecoxib (CeleBREX) 200 mg capsule; Take 200 mg by mouth daily Take with food          Subjective:      Patient ID: Marshal Mtz is a 48 y.o. female. Pt presents for her annual exam today--  She has no gyn complaints  Needs to make appt with Rheum to follow up some aches/pains/stiffness. .. She has no bleeding or pelvic pain--postmeno  H/o POF--on Rx  Bowel and bladder are regular  H/o bladder stone  Colonoscopy--2022  No breast concerns today  Last mammo--2023---left breast cysts  DEXA--2021--Osteopenia--repeat next year?--check with RHEUM  Normal pelvic us 7/22      No pap today. rx mamm  rx breast us  rx estradiol  rx provera  Daily ca, d  Flax, turmeric      The following portions of the patient's history were reviewed and updated as appropriate: allergies, current medications, past family history, past medical history, past social history, past surgical history and problem list.    Review of Systems   Constitutional: Negative for chills, fever and unexpected weight change. Gastrointestinal: Negative for abdominal pain, blood in stool, constipation and diarrhea. Genitourinary: Negative. Objective:      /80   Ht 5' 2.5" (1.588 m)   Wt 60.8 kg (134 lb)   LMP  (LMP Unknown)   BMI 24.12 kg/m²          Physical Exam  Vitals and nursing note reviewed. Constitutional:       Appearance: She is well-developed. HENT:      Head: Normocephalic and atraumatic. Chest:   Breasts:     Right: No inverted nipple, mass, nipple discharge or skin change. Left: No inverted nipple, mass, nipple discharge or skin change. Abdominal:      Palpations: Abdomen is soft. Genitourinary:     Exam position: Supine. Labia:         Right: No rash, tenderness or lesion. Left: No rash, tenderness or lesion. Vagina: Normal.      Cervix: No cervical motion tenderness, discharge or friability. Uterus: Normal.       Adnexa: Right adnexa normal and left adnexa normal.        Right: No mass, tenderness or fullness. Left: No mass, tenderness or fullness. Musculoskeletal:      Cervical back: Normal range of motion. Lymphadenopathy:      Lower Body: No right inguinal adenopathy. No left inguinal adenopathy.

## 2023-07-07 ENCOUNTER — APPOINTMENT (OUTPATIENT)
Dept: LAB | Age: 53
End: 2023-07-07
Payer: COMMERCIAL

## 2023-07-07 ENCOUNTER — OFFICE VISIT (OUTPATIENT)
Dept: PHYSICAL THERAPY | Facility: CLINIC | Age: 53
End: 2023-07-07
Payer: COMMERCIAL

## 2023-07-07 DIAGNOSIS — M54.12 CERVICAL RADICULOPATHY: Primary | ICD-10-CM

## 2023-07-07 DIAGNOSIS — Z00.00 HEALTHCARE MAINTENANCE: ICD-10-CM

## 2023-07-07 DIAGNOSIS — M25.511 CHRONIC RIGHT SHOULDER PAIN: ICD-10-CM

## 2023-07-07 DIAGNOSIS — G89.29 CHRONIC RIGHT SHOULDER PAIN: ICD-10-CM

## 2023-07-07 DIAGNOSIS — Z87.898 HISTORY OF PALPITATIONS: ICD-10-CM

## 2023-07-07 DIAGNOSIS — E78.00 HYPERCHOLESTEREMIA: ICD-10-CM

## 2023-07-07 LAB
ALBUMIN SERPL BCP-MCNC: 3.7 G/DL (ref 3.5–5)
ALP SERPL-CCNC: 83 U/L (ref 46–116)
ALT SERPL W P-5'-P-CCNC: 16 U/L (ref 12–78)
ANION GAP SERPL CALCULATED.3IONS-SCNC: 3 MMOL/L
AST SERPL W P-5'-P-CCNC: 10 U/L (ref 5–45)
BILIRUB SERPL-MCNC: 0.35 MG/DL (ref 0.2–1)
BUN SERPL-MCNC: 17 MG/DL (ref 5–25)
CALCIUM SERPL-MCNC: 9.2 MG/DL (ref 8.3–10.1)
CHLORIDE SERPL-SCNC: 110 MMOL/L (ref 96–108)
CHOLEST SERPL-MCNC: 239 MG/DL
CO2 SERPL-SCNC: 27 MMOL/L (ref 21–32)
CREAT SERPL-MCNC: 1.06 MG/DL (ref 0.6–1.3)
ERYTHROCYTE [DISTWIDTH] IN BLOOD BY AUTOMATED COUNT: 12.1 % (ref 11.6–15.1)
GFR SERPL CREATININE-BSD FRML MDRD: 60 ML/MIN/1.73SQ M
GLUCOSE P FAST SERPL-MCNC: 97 MG/DL (ref 65–99)
HCT VFR BLD AUTO: 38.9 % (ref 34.8–46.1)
HDLC SERPL-MCNC: 67 MG/DL
HGB BLD-MCNC: 12.2 G/DL (ref 11.5–15.4)
LDLC SERPL CALC-MCNC: 157 MG/DL (ref 0–100)
LDLC SERPL DIRECT ASSAY-MCNC: 137 MG/DL (ref 0–100)
MCH RBC QN AUTO: 29.5 PG (ref 26.8–34.3)
MCHC RBC AUTO-ENTMCNC: 31.4 G/DL (ref 31.4–37.4)
MCV RBC AUTO: 94 FL (ref 82–98)
NONHDLC SERPL-MCNC: 172 MG/DL
PLATELET # BLD AUTO: 302 THOUSANDS/UL (ref 149–390)
PMV BLD AUTO: 11.5 FL (ref 8.9–12.7)
POTASSIUM SERPL-SCNC: 3.9 MMOL/L (ref 3.5–5.3)
PROT SERPL-MCNC: 7.3 G/DL (ref 6.4–8.4)
RBC # BLD AUTO: 4.13 MILLION/UL (ref 3.81–5.12)
SODIUM SERPL-SCNC: 140 MMOL/L (ref 135–147)
TRIGL SERPL-MCNC: 73 MG/DL
WBC # BLD AUTO: 7.67 THOUSAND/UL (ref 4.31–10.16)

## 2023-07-07 PROCEDURE — 83721 ASSAY OF BLOOD LIPOPROTEIN: CPT

## 2023-07-07 PROCEDURE — 97140 MANUAL THERAPY 1/> REGIONS: CPT

## 2023-07-07 PROCEDURE — 36415 COLL VENOUS BLD VENIPUNCTURE: CPT

## 2023-07-07 PROCEDURE — 87086 URINE CULTURE/COLONY COUNT: CPT

## 2023-07-07 PROCEDURE — 80053 COMPREHEN METABOLIC PANEL: CPT

## 2023-07-07 PROCEDURE — 85027 COMPLETE CBC AUTOMATED: CPT

## 2023-07-07 PROCEDURE — 97112 NEUROMUSCULAR REEDUCATION: CPT

## 2023-07-07 PROCEDURE — 80061 LIPID PANEL: CPT

## 2023-07-07 NOTE — PROGRESS NOTES
Daily Note     Today's date: 2023  Patient name: Bebe Hammonds  : 1970  MRN: [id number]  Referring provider: Yecenia Sanchez PT  Dx:   Encounter Diagnosis     ICD-10-CM    1. Cervical radiculopathy  M54.12       2. Chronic right shoulder pain  M25.511     G89.29           Start Time: 0900  Stop Time: 0940  Total time in clinic (min): 40 minutes    Subjective: Patient reports increased soreness after last visit not sure why and feeling the usual today. Objective: See treatment diary below    Assessment: Patient tends to compensation with her UT during AAROM scaption and table slides. Able to eliminate it with tactile cueing. Plan: Cont. POC.      Precautions: none  Dx: lower c/s radiculopathy with a retraction DP, R subacromial impingement    Manuals        Graston suboccipitals  3 min                        Graston B LS, UT 8 min 5 min           Seated C3/4 extension SNAGs 2x10            R shoulder PROM    10"x5 ea 10"x6 ea 10"x6 ea       FF MWM   1x5 nv 1x10        Laser R subacromial space   4000J 4000J 4000J 4000J       Supine c/s retraction mob 1x10 3x10 3x10 np         Supine distraction, retraction, ext held            Neuro Re-Ed             Seated postural correction and education             C/s retraction  1x10  1x10 3x10 3x10 3x10 3x10       R ulnar nerve glides  1x10           R ulnar nerve flossing   1x10 3x10 modified  1x10 modified  3x10       C/s retraction OP 3x10 3x10 3x10 3x10 3x10 3x10       DNF    3"x5 3"x5 3"x5 3"x10                                             Seated t/s extension over chair 1x10 1x10           Prone thoracic extension 4x12 3x20 3x20 3x20 HEP        Prone T's             TB rows G/  3x20 Blue  3x15 Black  3x10 Black  3x15 Black  3x15 Black  3x20       scaption AAROM   1x10 1x10 1x10 1x10       Table slides     2x10 2x10                                 Ther Ex             TB ER   Y/  3x10 nv Y/  3x15 Y/  3x15 Ther Activity                                       Gait Training                                       Modalities

## 2023-07-08 LAB — BACTERIA UR CULT: NORMAL

## 2023-07-12 ENCOUNTER — APPOINTMENT (OUTPATIENT)
Dept: PHYSICAL THERAPY | Facility: CLINIC | Age: 53
End: 2023-07-12
Payer: COMMERCIAL

## 2023-07-14 ENCOUNTER — OFFICE VISIT (OUTPATIENT)
Dept: PHYSICAL THERAPY | Facility: CLINIC | Age: 53
End: 2023-07-14
Payer: COMMERCIAL

## 2023-07-14 DIAGNOSIS — M25.511 CHRONIC RIGHT SHOULDER PAIN: ICD-10-CM

## 2023-07-14 DIAGNOSIS — M54.12 CERVICAL RADICULOPATHY: Primary | ICD-10-CM

## 2023-07-14 DIAGNOSIS — G89.29 CHRONIC RIGHT SHOULDER PAIN: ICD-10-CM

## 2023-07-14 PROCEDURE — 97112 NEUROMUSCULAR REEDUCATION: CPT | Performed by: PHYSICAL THERAPIST

## 2023-07-14 NOTE — PROGRESS NOTES
Daily Note     Today's date: 2023  Patient name: Matt Calderon  : 1970  MRN: [id number]  Referring provider: Stefany Zuniga PT  Dx:   Encounter Diagnosis     ICD-10-CM    1. Cervical radiculopathy  M54.12       2. Chronic right shoulder pain  M25.511     G89.29                      Subjective: Pt reports overall improved R shoulder pain and ROM but has a random pattern of good days and bad days. She will be seeing rheumatology in Sept.    Objective: See treatment diary below    Assessment: Pt demonstrated improved active R FE, especially following MWM. Plan: Cont. POC. Transition to HEP nv.      Precautions: none  Dx: lower c/s radiculopathy with a retraction DP, R subacromial impingement    Manuals       Graston suboccipitals  3 min                        Graston B LS, UT 8 min 5 min           Seated C3/4 extension SNAGs 2x10            R shoulder PROM    10"x5 ea 10"x6 ea 10"x6 ea 10"x6 ea      FF MWM   1x5 nv 1x10  2x5      Laser R subacromial space   4000J 4000J 4000J 4000J       Supine c/s retraction mob 1x10 3x10 3x10 np         Supine distraction, retraction, ext held            Neuro Re-Ed             Seated postural correction and education             C/s retraction  1x10  1x10 3x10 3x10 3x10 3x10 3x10      R ulnar nerve glides  1x10           R ulnar nerve flossing   1x10 3x10 modified  1x10 modified  3x10 3x10      C/s retraction OP 3x10 3x10 3x10 3x10 3x10 3x10 3x10      DNF    3"x5 3"x5 3"x5 3"x10                                             Seated t/s extension over chair 1x10 1x10           Prone thoracic extension 4x12 3x20 3x20 3x20 HEP        Prone U T's       nv 1x10     Prone U Y's       nv 1x10     TB rows G/  3x20 Blue  3x15 Black  3x10 Black  3x15 Black  3x15 Black  3x20 Black  3x20 Silver  3x10     scaption AAROM   1x10 1x10 1x10 1x10       Table slides     2x10 2x10 3x10                                Ther Ex             TB ER   Y/  3x10 nv Y/  3x15 Y/  3x15 Y/  3x15 R/  3x10                                                                                                Ther Activity                                       Gait Training                                       Modalities

## 2023-07-21 ENCOUNTER — OFFICE VISIT (OUTPATIENT)
Dept: PHYSICAL THERAPY | Facility: CLINIC | Age: 53
End: 2023-07-21
Payer: COMMERCIAL

## 2023-07-21 DIAGNOSIS — G89.29 CHRONIC RIGHT SHOULDER PAIN: ICD-10-CM

## 2023-07-21 DIAGNOSIS — M54.12 CERVICAL RADICULOPATHY: Primary | ICD-10-CM

## 2023-07-21 DIAGNOSIS — M25.511 CHRONIC RIGHT SHOULDER PAIN: ICD-10-CM

## 2023-07-21 PROCEDURE — 97110 THERAPEUTIC EXERCISES: CPT | Performed by: PHYSICAL THERAPIST

## 2023-07-21 PROCEDURE — 97140 MANUAL THERAPY 1/> REGIONS: CPT | Performed by: PHYSICAL THERAPIST

## 2023-07-21 PROCEDURE — 97112 NEUROMUSCULAR REEDUCATION: CPT | Performed by: PHYSICAL THERAPIST

## 2023-07-21 NOTE — PROGRESS NOTES
Daily Note     Today's date: 2023  Patient name: Jose Guadalupe Red  : 1970  MRN: [id number]  Referring provider: Fifi Mcguire PT  Dx:   Encounter Diagnosis     ICD-10-CM    1. Cervical radiculopathy  M54.12       2. Chronic right shoulder pain  M25.511     G89.29                      Subjective: Pt reports having a good day today, notes improved ROM and body awareness following [facility] last visit. Objective: See treatment diary below  Updated HEP    Assessment: Pt demonstrated improved active FE and good understanding of updated HEP. Pt is appropriate for d/c to a maintenance HEP. Plan: D/C to HEP.      Precautions: none  Dx: lower c/s radiculopathy with a retraction DP, R subacromial impingement    Manuals      Graston suboccipitals  3 min                        Graston B LS, UT 8 min 5 min           Seated C3/4 extension SNAGs 2x10            R shoulder PROM    10"x5 ea 10"x6 ea 10"x6 ea 10"x6 ea 10"x10 ea     FF MWM   1x5 nv 1x10  2x5 3x5     Laser R subacromial space   4000J 4000J 4000J 4000J       Supine c/s retraction mob 1x10 3x10 3x10 np         Supine distraction, retraction, ext held            Neuro Re-Ed             Seated postural correction and education             C/s retraction  1x10  1x10 3x10 3x10 3x10 3x10 3x10 3x10     R ulnar nerve glides  1x10           R ulnar nerve flossing   1x10 3x10 modified  1x10 modified  3x10 3x10 3x10     C/s retraction OP 3x10 3x10 3x10 3x10 3x10 3x10 3x10 3x10     DNF    3"x5 3"x5 3"x5 3"x10 3"x10     C/s retraction with B rotation        1x10     R c/s rotation SNAGs        1x5                  Seated t/s extension over chair 1x10 1x10           Prone thoracic extension 4x12 3x20 3x20 3x20 HEP        Prone U T's       nv      Prone U Y's       nv      TB rows G/  3x20 Blue  3x15 Black  3x10 Black  3x15 Black  3x15 Black  3x20 Black  3x20 Silver  3x10     scaption AAROM   1x10 1x10 1x10 1x10       Table slides 2x10 2x10 3x10 3x10                               Ther Ex             TB ER   Y/  3x10 nv Y/  3x15 Y/  3x15 Y/  3x15 R/  3x10                                                                                                Ther Activity                                       Gait Training                                       Modalities

## 2023-07-24 ENCOUNTER — APPOINTMENT (OUTPATIENT)
Dept: PHYSICAL THERAPY | Facility: CLINIC | Age: 53
End: 2023-07-24
Payer: COMMERCIAL

## 2023-07-28 ENCOUNTER — APPOINTMENT (OUTPATIENT)
Dept: PHYSICAL THERAPY | Facility: CLINIC | Age: 53
End: 2023-07-28
Payer: COMMERCIAL

## 2023-07-28 ENCOUNTER — APPOINTMENT (OUTPATIENT)
Dept: LAB | Age: 53
End: 2023-07-28
Payer: COMMERCIAL

## 2023-07-28 DIAGNOSIS — R70.0 ELEVATED SED RATE: ICD-10-CM

## 2023-07-28 DIAGNOSIS — R53.83 OTHER FATIGUE: ICD-10-CM

## 2023-07-28 DIAGNOSIS — M19.90 SENILE ARTHRITIS: ICD-10-CM

## 2023-07-28 LAB
BASOPHILS # BLD AUTO: 0.06 THOUSANDS/ÂΜL (ref 0–0.1)
BASOPHILS NFR BLD AUTO: 1 % (ref 0–1)
EOSINOPHIL # BLD AUTO: 0.2 THOUSAND/ÂΜL (ref 0–0.61)
EOSINOPHIL NFR BLD AUTO: 3 % (ref 0–6)
ERYTHROCYTE [DISTWIDTH] IN BLOOD BY AUTOMATED COUNT: 12.3 % (ref 11.6–15.1)
HCT VFR BLD AUTO: 38.2 % (ref 34.8–46.1)
HGB BLD-MCNC: 12.2 G/DL (ref 11.5–15.4)
IMM GRANULOCYTES # BLD AUTO: 0.04 THOUSAND/UL (ref 0–0.2)
IMM GRANULOCYTES NFR BLD AUTO: 1 % (ref 0–2)
LYMPHOCYTES # BLD AUTO: 1.87 THOUSANDS/ÂΜL (ref 0.6–4.47)
LYMPHOCYTES NFR BLD AUTO: 28 % (ref 14–44)
MCH RBC QN AUTO: 29.7 PG (ref 26.8–34.3)
MCHC RBC AUTO-ENTMCNC: 31.9 G/DL (ref 31.4–37.4)
MCV RBC AUTO: 93 FL (ref 82–98)
MONOCYTES # BLD AUTO: 0.55 THOUSAND/ÂΜL (ref 0.17–1.22)
MONOCYTES NFR BLD AUTO: 8 % (ref 4–12)
NEUTROPHILS # BLD AUTO: 3.87 THOUSANDS/ÂΜL (ref 1.85–7.62)
NEUTS SEG NFR BLD AUTO: 59 % (ref 43–75)
NRBC BLD AUTO-RTO: 0 /100 WBCS
PLATELET # BLD AUTO: 313 THOUSANDS/UL (ref 149–390)
PMV BLD AUTO: 11.4 FL (ref 8.9–12.7)
RBC # BLD AUTO: 4.11 MILLION/UL (ref 3.81–5.12)
T4 FREE SERPL-MCNC: 0.93 NG/DL (ref 0.61–1.12)
TSH SERPL DL<=0.05 MIU/L-ACNC: 1.15 UIU/ML (ref 0.45–4.5)
URATE SERPL-MCNC: 3.3 MG/DL (ref 2–7.5)
WBC # BLD AUTO: 6.59 THOUSAND/UL (ref 4.31–10.16)

## 2023-07-28 PROCEDURE — 82784 ASSAY IGA/IGD/IGG/IGM EACH: CPT

## 2023-07-28 PROCEDURE — 86231 EMA EACH IG CLASS: CPT

## 2023-07-28 PROCEDURE — 84439 ASSAY OF FREE THYROXINE: CPT

## 2023-07-28 PROCEDURE — 36415 COLL VENOUS BLD VENIPUNCTURE: CPT

## 2023-07-28 PROCEDURE — 86618 LYME DISEASE ANTIBODY: CPT

## 2023-07-28 PROCEDURE — 86364 TISS TRNSGLTMNASE EA IG CLAS: CPT

## 2023-07-28 PROCEDURE — 84550 ASSAY OF BLOOD/URIC ACID: CPT

## 2023-07-28 PROCEDURE — 84443 ASSAY THYROID STIM HORMONE: CPT

## 2023-07-28 PROCEDURE — 86258 DGP ANTIBODY EACH IG CLASS: CPT

## 2023-07-28 PROCEDURE — 85025 COMPLETE CBC W/AUTO DIFF WBC: CPT

## 2023-07-29 LAB
B BURGDOR IGG+IGM SER QL IA: NEGATIVE
ENDOMYSIUM IGA SER QL: NEGATIVE
GLIADIN PEPTIDE IGA SER-ACNC: 2 UNITS (ref 0–19)
GLIADIN PEPTIDE IGG SER-ACNC: 3 UNITS (ref 0–19)
IGA SERPL-MCNC: 126 MG/DL (ref 87–352)
TTG IGA SER-ACNC: <2 U/ML (ref 0–3)
TTG IGG SER-ACNC: <2 U/ML (ref 0–5)

## 2023-09-07 ENCOUNTER — APPOINTMENT (OUTPATIENT)
Dept: LAB | Age: 53
End: 2023-09-07
Payer: COMMERCIAL

## 2023-09-07 DIAGNOSIS — M79.10 MYALGIA: ICD-10-CM

## 2023-09-07 DIAGNOSIS — M46.90 INFLAMMATORY SPONDYLOPATHY, UNSPECIFIED SPINAL REGION (HCC): ICD-10-CM

## 2023-09-07 LAB
CK SERPL-CCNC: 114 U/L (ref 26–192)
CRP SERPL QL: 3.4 MG/L
ERYTHROCYTE [SEDIMENTATION RATE] IN BLOOD: 17 MM/HOUR (ref 0–29)

## 2023-09-07 PROCEDURE — 36415 COLL VENOUS BLD VENIPUNCTURE: CPT

## 2023-09-07 PROCEDURE — 85652 RBC SED RATE AUTOMATED: CPT

## 2023-09-07 PROCEDURE — 86140 C-REACTIVE PROTEIN: CPT

## 2023-09-07 PROCEDURE — 86235 NUCLEAR ANTIGEN ANTIBODY: CPT

## 2023-09-07 PROCEDURE — 82550 ASSAY OF CK (CPK): CPT

## 2023-09-07 PROCEDURE — 86200 CCP ANTIBODY: CPT

## 2023-09-07 PROCEDURE — 81374 HLA I TYPING 1 ANTIGEN LR: CPT

## 2023-09-09 LAB
ENA RNP AB SER-ACNC: <0.2 AI (ref 0–0.9)
ENA SM AB SER-ACNC: <0.2 AI (ref 0–0.9)

## 2023-09-10 LAB — CCP AB SER IA-ACNC: 0.8

## 2023-09-18 LAB — HLA-B27 QL NAA+PROBE: NEGATIVE

## 2023-12-18 ENCOUNTER — HOSPITAL ENCOUNTER (OUTPATIENT)
Dept: MAMMOGRAPHY | Facility: CLINIC | Age: 53
Discharge: HOME | End: 2023-12-18
Payer: COMMERCIAL

## 2023-12-18 ENCOUNTER — HOSPITAL ENCOUNTER (OUTPATIENT)
Dept: ULTRASOUND IMAGING | Facility: CLINIC | Age: 53
Discharge: HOME | End: 2023-12-18
Payer: COMMERCIAL

## 2023-12-18 VITALS — BODY MASS INDEX: 23.74 KG/M2 | HEIGHT: 63 IN | WEIGHT: 134 LBS

## 2023-12-18 DIAGNOSIS — Z12.31 ENCOUNTER FOR SCREENING MAMMOGRAM FOR MALIGNANT NEOPLASM OF BREAST: ICD-10-CM

## 2023-12-18 DIAGNOSIS — N60.02 BREAST CYST, LEFT: ICD-10-CM

## 2023-12-18 PROCEDURE — 76642 ULTRASOUND BREAST LIMITED: CPT

## 2023-12-18 PROCEDURE — 77066 DX MAMMO INCL CAD BI: CPT

## 2023-12-18 PROCEDURE — G0279 TOMOSYNTHESIS, MAMMO: HCPCS

## 2024-02-06 ENCOUNTER — TELEPHONE (OUTPATIENT)
Age: 54
End: 2024-02-06

## 2024-02-06 NOTE — TELEPHONE ENCOUNTER
Left message for patient to call and r/s as Dr Tapia is retired. Patient can r/s with another provider including JESSE in [place]

## 2024-02-23 ENCOUNTER — APPOINTMENT (OUTPATIENT)
Dept: LAB | Age: 54
End: 2024-02-23
Payer: COMMERCIAL

## 2024-02-23 DIAGNOSIS — Z79.1 ENCOUNTER FOR LONG-TERM (CURRENT) USE OF NON-STEROIDAL ANTI-INFLAMMATORIES: ICD-10-CM

## 2024-02-23 DIAGNOSIS — M06.4 INFLAMMATORY POLYARTHROPATHY (HCC): ICD-10-CM

## 2024-02-23 LAB
ALBUMIN SERPL BCP-MCNC: 4.3 G/DL (ref 3.5–5)
ALP SERPL-CCNC: 47 U/L (ref 34–104)
ALT SERPL W P-5'-P-CCNC: 12 U/L (ref 7–52)
ANION GAP SERPL CALCULATED.3IONS-SCNC: 7 MMOL/L
AST SERPL W P-5'-P-CCNC: 14 U/L (ref 13–39)
BASOPHILS # BLD AUTO: 0.04 THOUSANDS/ÂΜL (ref 0–0.1)
BASOPHILS NFR BLD AUTO: 1 % (ref 0–1)
BILIRUB SERPL-MCNC: 0.45 MG/DL (ref 0.2–1)
BUN SERPL-MCNC: 16 MG/DL (ref 5–25)
CALCIUM SERPL-MCNC: 8.8 MG/DL (ref 8.4–10.2)
CHLORIDE SERPL-SCNC: 105 MMOL/L (ref 96–108)
CO2 SERPL-SCNC: 29 MMOL/L (ref 21–32)
CREAT SERPL-MCNC: 0.71 MG/DL (ref 0.6–1.3)
CRP SERPL QL: <1 MG/L
EOSINOPHIL # BLD AUTO: 0.17 THOUSAND/ÂΜL (ref 0–0.61)
EOSINOPHIL NFR BLD AUTO: 2 % (ref 0–6)
ERYTHROCYTE [DISTWIDTH] IN BLOOD BY AUTOMATED COUNT: 12.2 % (ref 11.6–15.1)
ERYTHROCYTE [SEDIMENTATION RATE] IN BLOOD: 16 MM/HOUR (ref 0–29)
GFR SERPL CREATININE-BSD FRML MDRD: 98 ML/MIN/1.73SQ M
GLUCOSE SERPL-MCNC: 77 MG/DL (ref 65–140)
HCT VFR BLD AUTO: 38.5 % (ref 34.8–46.1)
HGB BLD-MCNC: 12 G/DL (ref 11.5–15.4)
IMM GRANULOCYTES # BLD AUTO: 0.03 THOUSAND/UL (ref 0–0.2)
IMM GRANULOCYTES NFR BLD AUTO: 0 % (ref 0–2)
LYMPHOCYTES # BLD AUTO: 3.15 THOUSANDS/ÂΜL (ref 0.6–4.47)
LYMPHOCYTES NFR BLD AUTO: 39 % (ref 14–44)
MCH RBC QN AUTO: 30.5 PG (ref 26.8–34.3)
MCHC RBC AUTO-ENTMCNC: 31.2 G/DL (ref 31.4–37.4)
MCV RBC AUTO: 98 FL (ref 82–98)
MONOCYTES # BLD AUTO: 0.7 THOUSAND/ÂΜL (ref 0.17–1.22)
MONOCYTES NFR BLD AUTO: 9 % (ref 4–12)
NEUTROPHILS # BLD AUTO: 3.92 THOUSANDS/ÂΜL (ref 1.85–7.62)
NEUTS SEG NFR BLD AUTO: 49 % (ref 43–75)
NRBC BLD AUTO-RTO: 0 /100 WBCS
PLATELET # BLD AUTO: 270 THOUSANDS/UL (ref 149–390)
PMV BLD AUTO: 11.1 FL (ref 8.9–12.7)
POTASSIUM SERPL-SCNC: 3.5 MMOL/L (ref 3.5–5.3)
PROT SERPL-MCNC: 6.1 G/DL (ref 6.4–8.4)
RBC # BLD AUTO: 3.93 MILLION/UL (ref 3.81–5.12)
SODIUM SERPL-SCNC: 141 MMOL/L (ref 135–147)
WBC # BLD AUTO: 8.01 THOUSAND/UL (ref 4.31–10.16)

## 2024-02-23 PROCEDURE — 36415 COLL VENOUS BLD VENIPUNCTURE: CPT

## 2024-02-23 PROCEDURE — 85025 COMPLETE CBC W/AUTO DIFF WBC: CPT

## 2024-02-23 PROCEDURE — 80053 COMPREHEN METABOLIC PANEL: CPT

## 2024-02-23 PROCEDURE — 86140 C-REACTIVE PROTEIN: CPT

## 2024-02-23 PROCEDURE — 85652 RBC SED RATE AUTOMATED: CPT

## 2024-07-09 ENCOUNTER — ANNUAL EXAM (OUTPATIENT)
Dept: GYNECOLOGY | Facility: CLINIC | Age: 54
End: 2024-07-09
Payer: COMMERCIAL

## 2024-07-09 VITALS
DIASTOLIC BLOOD PRESSURE: 80 MMHG | WEIGHT: 138 LBS | HEIGHT: 63 IN | BODY MASS INDEX: 24.45 KG/M2 | SYSTOLIC BLOOD PRESSURE: 126 MMHG

## 2024-07-09 DIAGNOSIS — R52 BODY ACHES: ICD-10-CM

## 2024-07-09 DIAGNOSIS — M25.50 ARTHRALGIA, UNSPECIFIED JOINT: ICD-10-CM

## 2024-07-09 DIAGNOSIS — Z01.419 ENCOUNTER FOR WELL WOMAN EXAM: Primary | ICD-10-CM

## 2024-07-09 DIAGNOSIS — Z12.39 ENCOUNTER FOR SCREENING BREAST EXAMINATION: ICD-10-CM

## 2024-07-09 DIAGNOSIS — M85.80 OSTEOPENIA, UNSPECIFIED LOCATION: ICD-10-CM

## 2024-07-09 DIAGNOSIS — Z79.890 POST-MENOPAUSE ON HRT (HORMONE REPLACEMENT THERAPY): ICD-10-CM

## 2024-07-09 DIAGNOSIS — E28.39 PREMATURE OVARIAN FAILURE: ICD-10-CM

## 2024-07-09 PROCEDURE — 99396 PREV VISIT EST AGE 40-64: CPT | Performed by: PHYSICIAN ASSISTANT

## 2024-07-09 RX ORDER — MELOXICAM 7.5 MG/1
7.5 TABLET ORAL 2 TIMES DAILY WITH MEALS
COMMUNITY
Start: 2024-07-07

## 2024-07-09 RX ORDER — ESTRADIOL 0.5 MG/1
0.5 TABLET ORAL 3 TIMES DAILY
Qty: 270 TABLET | Refills: 4 | Status: SHIPPED | OUTPATIENT
Start: 2024-07-09

## 2024-07-09 RX ORDER — MEDROXYPROGESTERONE ACETATE 5 MG/1
5 TABLET ORAL DAILY
Qty: 90 TABLET | Refills: 4 | Status: SHIPPED | OUTPATIENT
Start: 2024-07-09

## 2024-07-12 NOTE — PROGRESS NOTES
Assessment/Plan:      Diagnoses and all orders for this visit:    Encounter for well woman exam    Encounter for screening breast examination    Post-menopause on HRT (hormone replacement therapy)  -     medroxyPROGESTERone (PROVERA) 5 mg tablet; Take 1 tablet (5 mg total) by mouth daily  -     Ambulatory Referral to Obstetrics / Gynecology; Future    Premature ovarian failure  -     estradiol (ESTRACE) 0.5 MG tablet; Take 1 tablet (0.5 mg total) by mouth 3 (three) times a day  -     Ambulatory Referral to Obstetrics / Gynecology; Future    Osteopenia, unspecified location  -     Ambulatory Referral to Obstetrics / Gynecology; Future    Body aches  -     Ambulatory Referral to Obstetrics / Gynecology; Future    Arthralgia, unspecified joint  -     Ambulatory Referral to Obstetrics / Gynecology; Future    Other orders  -     meloxicam (MOBIC) 7.5 mg tablet; Take 7.5 mg by mouth 2 (two) times a day with meals  -     Lactobacillus (PROBIOTIC ACIDOPHILUS PO); Take 1 tablet by mouth in the morning          Subjective:     Patient ID: Isela Joshi is a 54 y.o. female.    Pt presents for her annual exam today--  She has no complaints but has been on hormones for many years--h/o POF  Pt in general feels well  Some aches and pains---working with RHEUM  She has no bleeding  no pelvic pain  Bowel and bladder are regular  Colonoscopy--22  No breast concerns today  Last mammo--23  Dexa--21    No pap today.    Rx mamm  Rx estradiol  Rx provera  Daily ca, d  Refer to jaclyn cohen to discuss options        Review of Systems   Constitutional:  Negative for chills, fever and unexpected weight change.   HENT:  Negative for ear pain and sore throat.    Eyes:  Negative for pain and visual disturbance.   Respiratory:  Negative for cough and shortness of breath.    Cardiovascular:  Negative for chest pain and palpitations.   Gastrointestinal:  Negative for abdominal pain, blood in stool, constipation, diarrhea and vomiting.    Genitourinary: Negative.  Negative for dysuria and hematuria.   Musculoskeletal:  Negative for arthralgias and back pain.   Skin:  Negative for color change and rash.   Neurological:  Negative for seizures and syncope.   All other systems reviewed and are negative.        Objective:     Physical Exam  Vitals and nursing note reviewed.   Constitutional:       Appearance: Normal appearance. She is well-developed.   HENT:      Head: Normocephalic and atraumatic.   Chest:   Breasts:     Right: No inverted nipple, mass, nipple discharge or skin change.      Left: No inverted nipple, mass, nipple discharge or skin change.   Abdominal:      Palpations: Abdomen is soft.   Genitourinary:     Exam position: Supine.      Labia:         Right: No rash, tenderness or lesion.         Left: No rash, tenderness or lesion.       Vagina: Normal.      Cervix: No cervical motion tenderness, discharge or friability.      Adnexa:         Right: No mass, tenderness or fullness.          Left: No mass, tenderness or fullness.     Musculoskeletal:      Cervical back: Normal range of motion.   Lymphadenopathy:      Lower Body: No right inguinal adenopathy. No left inguinal adenopathy.   Neurological:      Mental Status: She is alert.

## 2024-11-12 ENCOUNTER — APPOINTMENT (OUTPATIENT)
Dept: LAB | Age: 54
End: 2024-11-12
Payer: COMMERCIAL

## 2024-11-12 DIAGNOSIS — M06.4 INFLAMMATORY POLYARTHROPATHY (HCC): ICD-10-CM

## 2024-11-12 LAB — CRP SERPL QL: <1 MG/L

## 2024-11-12 PROCEDURE — 86140 C-REACTIVE PROTEIN: CPT

## 2024-12-19 ENCOUNTER — HOSPITAL ENCOUNTER (OUTPATIENT)
Dept: ULTRASOUND IMAGING | Facility: CLINIC | Age: 54
Discharge: HOME | End: 2024-12-19
Payer: COMMERCIAL

## 2024-12-19 ENCOUNTER — HOSPITAL ENCOUNTER (OUTPATIENT)
Dept: MAMMOGRAPHY | Facility: CLINIC | Age: 54
Discharge: HOME | End: 2024-12-19
Payer: COMMERCIAL

## 2024-12-19 VITALS — WEIGHT: 138 LBS | BODY MASS INDEX: 24.45 KG/M2 | HEIGHT: 63 IN

## 2024-12-19 DIAGNOSIS — R92.8 ABNORMAL FINDINGS ON DIAGNOSTIC IMAGING OF BREAST: ICD-10-CM

## 2024-12-19 PROCEDURE — 77066 DX MAMMO INCL CAD BI: CPT

## 2024-12-19 PROCEDURE — G0279 TOMOSYNTHESIS, MAMMO: HCPCS

## 2024-12-19 PROCEDURE — 76642 ULTRASOUND BREAST LIMITED: CPT

## 2025-01-21 DIAGNOSIS — Z12.31 ENCOUNTER FOR SCREENING MAMMOGRAM FOR MALIGNANT NEOPLASM OF BREAST: Primary | ICD-10-CM

## 2025-04-01 ENCOUNTER — OFFICE VISIT (OUTPATIENT)
Age: 55
End: 2025-04-01

## 2025-04-01 DIAGNOSIS — M85.80 OSTEOPENIA, UNSPECIFIED LOCATION: ICD-10-CM

## 2025-04-01 DIAGNOSIS — Z79.890 POST-MENOPAUSE ON HRT (HORMONE REPLACEMENT THERAPY): ICD-10-CM

## 2025-04-01 DIAGNOSIS — R52 BODY ACHES: ICD-10-CM

## 2025-04-01 DIAGNOSIS — M25.50 ARTHRALGIA, UNSPECIFIED JOINT: ICD-10-CM

## 2025-04-01 DIAGNOSIS — E28.39 PREMATURE OVARIAN FAILURE: ICD-10-CM

## 2025-04-01 RX ORDER — PROGESTERONE 100 MG/1
100 CAPSULE ORAL
Qty: 30 CAPSULE | Refills: 11 | Status: SHIPPED | OUTPATIENT
Start: 2025-04-01

## 2025-04-04 NOTE — PROGRESS NOTES
:  Assessment & Plan  Post-menopause on HRT (hormone replacement therapy)    Orders:    Ambulatory Referral to Obstetrics / Gynecology    Progesterone 100 MG CAPS; Take 100 mg by mouth at bedtime    Premature ovarian failure    Orders:    Ambulatory Referral to Obstetrics / Gynecology    Progesterone 100 MG CAPS; Take 100 mg by mouth at bedtime    Osteopenia, unspecified location    Orders:    Ambulatory Referral to Obstetrics / Gynecology    Progesterone 100 MG CAPS; Take 100 mg by mouth at bedtime    Body aches    Orders:    Ambulatory Referral to Obstetrics / Gynecology    Arthralgia, unspecified joint    Orders:    Ambulatory Referral to Obstetrics / Gynecology    1) I discussed the long term health benefits of E2/PG, including: reduction of CVD risk, reduction of hyperlipidemia, reduction of DM and GLP-1 agonist activity with mild appetite suppression; reduction of colon CA, osteoporosis and cognitive decline, Alzheimer's disease.  2) Controversies surrounding estrogen based HRT discussed, including the fear based off the [facility] trials concerning Prempro. I will not be prescribing Prempro. One should not assume all therapies confer the same risk or benefit. Stay tuned to the REPLEN[place] trials for E2/PG therapy in the [place] with [employer], but [place] trials with same medication point to safety and efficacy with superior health outcomes with women on this type of HRT. She needs to discontinue Provera immediately.  3) NAMS ( [place] [employer]) guidelines are to offer APPROPRIATE dosed HRT for the APPROPRIATE time frame as long as benefits outweigh the risks, and age is not a determining factor for stopping it. I would see her on this indefinitely as long as benefits outweigh risks.  4) I recommend: estradiol 2 mg daily ( she is on 1.5 taking 3 0.5 mg tabs?), with progesterone 100 mg nightly. Side effects of HRT reviewed including vaginal bleeding, breast tenderness and somnolence, easily remedied  with dose adjustment. I'm not sure joint pain is entirely due to ovarian hormone loss but PG has sammie like receptor activity, great for chronic pain syndromes. Provera does not offer this advantage.   5) Follow up 1-2 years or prn.    This was a 50 minute visit with greater than 50% of time spent in face to face counseling and coordination of care.    Isela presents for hormone consult, her first visit with me; referred by SHITAL Nolan with [facility]  Isela was diagnosed with premature ovarian insufficiency (POI) in her early 30s after first . Eventually conceived twins with donor egg. Her story:  1) Was put on estradiol 0.5 mg orally with 5 mg MPA ( Provera), but has noticed:  2) Increased joint pain and stiffness in her neck. Oral steroids, NSAIDS helpful. Now on Meloxicam. Rheumatology consulted, does not qualify as RA.   3) She is concerned about long term use of HRT?   PMXH: as above; osteopenia.   SHX: denies tobacco, ETOH. 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 MGM:head and neck CA. PGF old age. Dad CVD, AMI. PGM old age. PGF AMI. 1 sister, healthy. 3 kids, 15-15, autism, DM1.       Review of Systems   Constitutional: Negative.    Endocrine: Negative.    Genitourinary: Negative.    Musculoskeletal:  Positive for arthralgias, myalgias and neck pain.   Skin: Negative.    Neurological: Negative.    Psychiatric/Behavioral: Negative.            Physical Exam  Constitutional:       Appearance: Normal appearance. She is normal weight.   Neurological:      Mental Status: She is alert.

## 2025-04-04 NOTE — ASSESSMENT & PLAN NOTE
Orders:    Ambulatory Referral to Obstetrics / Gynecology    Progesterone 100 MG CAPS; Take 100 mg by mouth at bedtime

## 2025-06-17 ENCOUNTER — HOSPITAL ENCOUNTER (EMERGENCY)
Facility: HOSPITAL | Age: 55
Discharge: HOME | End: 2025-06-17
Attending: EMERGENCY MEDICINE | Admitting: EMERGENCY MEDICINE
Payer: COMMERCIAL

## 2025-06-17 ENCOUNTER — APPOINTMENT (EMERGENCY)
Dept: RADIOLOGY | Facility: HOSPITAL | Age: 55
End: 2025-06-17
Payer: COMMERCIAL

## 2025-06-17 VITALS
HEART RATE: 73 BPM | OXYGEN SATURATION: 95 % | BODY MASS INDEX: 24.2 KG/M2 | TEMPERATURE: 97.8 F | RESPIRATION RATE: 17 BRPM | DIASTOLIC BLOOD PRESSURE: 68 MMHG | WEIGHT: 134.48 LBS | SYSTOLIC BLOOD PRESSURE: 112 MMHG

## 2025-06-17 DIAGNOSIS — R00.2 PALPITATIONS: Primary | ICD-10-CM

## 2025-06-17 LAB
2HR DELTA HS TROPONIN: 5 NG/L
ALBUMIN SERPL BCG-MCNC: 4.7 G/DL (ref 3.5–5)
ALP SERPL-CCNC: 69 U/L (ref 34–104)
ALT SERPL W P-5'-P-CCNC: 16 U/L (ref 7–52)
ANION GAP SERPL CALCULATED.3IONS-SCNC: 8 MMOL/L (ref 4–13)
AST SERPL W P-5'-P-CCNC: 16 U/L (ref 13–39)
BASOPHILS # BLD AUTO: 0.01 THOUSANDS/ÂΜL (ref 0–0.1)
BASOPHILS NFR BLD AUTO: 0 % (ref 0–1)
BILIRUB SERPL-MCNC: 0.33 MG/DL (ref 0.2–1)
BILIRUB UR QL STRIP: NEGATIVE
BUN SERPL-MCNC: 14 MG/DL (ref 5–25)
CALCIUM SERPL-MCNC: 9.6 MG/DL (ref 8.4–10.2)
CARDIAC TROPONIN I PNL SERPL HS: 4 NG/L (ref ?–50)
CARDIAC TROPONIN I PNL SERPL HS: 9 NG/L (ref ?–50)
CHLORIDE SERPL-SCNC: 105 MMOL/L (ref 96–108)
CLARITY UR: CLEAR
CO2 SERPL-SCNC: 27 MMOL/L (ref 21–32)
COLOR UR: COLORLESS
CREAT SERPL-MCNC: 0.78 MG/DL (ref 0.6–1.3)
D DIMER PPP FEU-MCNC: <0.27 UG/ML FEU
EOSINOPHIL # BLD AUTO: 0 THOUSAND/ÂΜL (ref 0–0.61)
EOSINOPHIL NFR BLD AUTO: 0 % (ref 0–6)
ERYTHROCYTE [DISTWIDTH] IN BLOOD BY AUTOMATED COUNT: 11.9 % (ref 11.6–15.1)
EXT PREGNANCY TEST URINE: NEGATIVE
EXT. CONTROL: NORMAL
GFR SERPL CREATININE-BSD FRML MDRD: 87 ML/MIN/1.73SQ M
GLUCOSE SERPL-MCNC: 139 MG/DL (ref 65–140)
GLUCOSE UR STRIP-MCNC: NEGATIVE MG/DL
HCT VFR BLD AUTO: 42.3 % (ref 34.8–46.1)
HGB BLD-MCNC: 14.1 G/DL (ref 11.5–15.4)
HGB UR QL STRIP.AUTO: NEGATIVE
IMM GRANULOCYTES # BLD AUTO: 0.04 THOUSAND/UL (ref 0–0.2)
IMM GRANULOCYTES NFR BLD AUTO: 1 % (ref 0–2)
KETONES UR STRIP-MCNC: NEGATIVE MG/DL
LEUKOCYTE ESTERASE UR QL STRIP: NEGATIVE
LYMPHOCYTES # BLD AUTO: 1.32 THOUSANDS/ÂΜL (ref 0.6–4.47)
LYMPHOCYTES NFR BLD AUTO: 16 % (ref 14–44)
MCH RBC QN AUTO: 30.8 PG (ref 26.8–34.3)
MCHC RBC AUTO-ENTMCNC: 33.3 G/DL (ref 31.4–37.4)
MCV RBC AUTO: 92 FL (ref 82–98)
MONOCYTES # BLD AUTO: 0.44 THOUSAND/ÂΜL (ref 0.17–1.22)
MONOCYTES NFR BLD AUTO: 5 % (ref 4–12)
NEUTROPHILS # BLD AUTO: 6.38 THOUSANDS/ÂΜL (ref 1.85–7.62)
NEUTS SEG NFR BLD AUTO: 78 % (ref 43–75)
NITRITE UR QL STRIP: NEGATIVE
NRBC BLD AUTO-RTO: 0 /100 WBCS
PH UR STRIP.AUTO: 7 [PH]
PLATELET # BLD AUTO: 331 THOUSANDS/UL (ref 149–390)
PMV BLD AUTO: 10.9 FL (ref 8.9–12.7)
POTASSIUM SERPL-SCNC: 3.7 MMOL/L (ref 3.5–5.3)
PROT SERPL-MCNC: 7.3 G/DL (ref 6.4–8.4)
PROT UR STRIP-MCNC: NEGATIVE MG/DL
RBC # BLD AUTO: 4.58 MILLION/UL (ref 3.81–5.12)
SODIUM SERPL-SCNC: 140 MMOL/L (ref 135–147)
SP GR UR STRIP.AUTO: 1 (ref 1–1.03)
TSH SERPL DL<=0.05 MIU/L-ACNC: 0.54 UIU/ML (ref 0.45–4.5)
UROBILINOGEN UR STRIP-ACNC: <2 MG/DL
WBC # BLD AUTO: 8.19 THOUSAND/UL (ref 4.31–10.16)

## 2025-06-17 PROCEDURE — 81003 URINALYSIS AUTO W/O SCOPE: CPT | Performed by: EMERGENCY MEDICINE

## 2025-06-17 PROCEDURE — 71045 X-RAY EXAM CHEST 1 VIEW: CPT

## 2025-06-17 PROCEDURE — 93005 ELECTROCARDIOGRAM TRACING: CPT

## 2025-06-17 PROCEDURE — 96375 TX/PRO/DX INJ NEW DRUG ADDON: CPT

## 2025-06-17 PROCEDURE — 80053 COMPREHEN METABOLIC PANEL: CPT | Performed by: EMERGENCY MEDICINE

## 2025-06-17 PROCEDURE — 99284 EMERGENCY DEPT VISIT MOD MDM: CPT | Performed by: EMERGENCY MEDICINE

## 2025-06-17 PROCEDURE — 85379 FIBRIN DEGRADATION QUANT: CPT | Performed by: EMERGENCY MEDICINE

## 2025-06-17 PROCEDURE — 84443 ASSAY THYROID STIM HORMONE: CPT | Performed by: EMERGENCY MEDICINE

## 2025-06-17 PROCEDURE — 81025 URINE PREGNANCY TEST: CPT | Performed by: EMERGENCY MEDICINE

## 2025-06-17 PROCEDURE — 84484 ASSAY OF TROPONIN QUANT: CPT | Performed by: EMERGENCY MEDICINE

## 2025-06-17 PROCEDURE — 96365 THER/PROPH/DIAG IV INF INIT: CPT

## 2025-06-17 PROCEDURE — 85025 COMPLETE CBC W/AUTO DIFF WBC: CPT | Performed by: EMERGENCY MEDICINE

## 2025-06-17 PROCEDURE — 84439 ASSAY OF FREE THYROXINE: CPT | Performed by: EMERGENCY MEDICINE

## 2025-06-17 PROCEDURE — 36415 COLL VENOUS BLD VENIPUNCTURE: CPT | Performed by: EMERGENCY MEDICINE

## 2025-06-17 PROCEDURE — 99285 EMERGENCY DEPT VISIT HI MDM: CPT

## 2025-06-17 PROCEDURE — 96366 THER/PROPH/DIAG IV INF ADDON: CPT

## 2025-06-17 RX ORDER — KETOROLAC TROMETHAMINE 30 MG/ML
15 INJECTION, SOLUTION INTRAMUSCULAR; INTRAVENOUS ONCE
Status: COMPLETED | OUTPATIENT
Start: 2025-06-17 | End: 2025-06-17

## 2025-06-17 RX ORDER — PREDNISONE 20 MG/1
2 TABLET ORAL DAILY
COMMUNITY
Start: 2025-06-13

## 2025-06-17 RX ORDER — CEFDINIR 300 MG/1
1 CAPSULE ORAL 2 TIMES DAILY
COMMUNITY
Start: 2025-06-13

## 2025-06-17 RX ORDER — SODIUM CHLORIDE 9 MG/ML
3 INJECTION INTRAVENOUS
Status: DISCONTINUED | OUTPATIENT
Start: 2025-06-17 | End: 2025-06-18 | Stop reason: HOSPADM

## 2025-06-17 RX ORDER — SODIUM CHLORIDE, SODIUM GLUCONATE, SODIUM ACETATE, POTASSIUM CHLORIDE, MAGNESIUM CHLORIDE, SODIUM PHOSPHATE, DIBASIC, AND POTASSIUM PHOSPHATE .53; .5; .37; .037; .03; .012; .00082 G/100ML; G/100ML; G/100ML; G/100ML; G/100ML; G/100ML; G/100ML
1000 INJECTION, SOLUTION INTRAVENOUS ONCE
Status: COMPLETED | OUTPATIENT
Start: 2025-06-17 | End: 2025-06-17

## 2025-06-17 RX ADMIN — SODIUM CHLORIDE, SODIUM GLUCONATE, SODIUM ACETATE, POTASSIUM CHLORIDE, MAGNESIUM CHLORIDE, SODIUM PHOSPHATE, DIBASIC, AND POTASSIUM PHOSPHATE 1000 ML: .53; .5; .37; .037; .03; .012; .00082 INJECTION, SOLUTION INTRAVENOUS at 20:13

## 2025-06-17 RX ADMIN — KETOROLAC TROMETHAMINE 15 MG: 30 INJECTION, SOLUTION INTRAMUSCULAR; INTRAVENOUS at 22:23

## 2025-06-17 NOTE — ED PROVIDER NOTES
Time reflects when diagnosis was documented in both MDM as applicable and the Disposition within this note       Time User Action Codes Description Comment    6/17/2025 10:22 PM Rebecca Patterson Add [R00.2] Palpitations           ED Disposition       ED Disposition   Discharge    Condition   Stable    Date/Time   Tue Jun 17, 2025 10:22 PM    Comment   Isela Joshi discharge to home/self care.                   Assessment & Plan       Medical Decision Making  This patient presented with tachycardia with no apparent emergent cause. Patient is afebrile with no infectious symptoms. no signs of hyperthyroidism but could not rule out out given that TSH is borderline low.  Free T4 is pending.  Recommend the patient follow-up with primary care for further evaluation. considered PE but less likely (no DVT risk factors, leg swelling, and negative D-dimer). doubt ACS ( non STEMI ekg and neg trop), no anemia on CBC. patient denies any drug/alcohol intoxication or withdrawal. patient euvolemic on exam and does not appear dry so doubt orthostatic changes.  Patient treated symptomatically with Toradol and IV fluids with complete resolution of symptoms.  Discussed lab findings and the necessity to follow with primary care.  Patient was given strict return precautions.    Amount and/or Complexity of Data Reviewed  Labs: ordered.  Radiology: ordered and independent interpretation performed.  ECG/medicine tests: ordered and independent interpretation performed.    Risk  Prescription drug management.             Medications   sodium chloride (PF) 0.9 % injection 3 mL (has no administration in time range)   ketorolac (TORADOL) injection 15 mg (15 mg Intravenous Given 6/17/25 2223)   multi-electrolyte (Plasmalyte-A/Isolyte-S PH 7.4/Normosol-R) IV bolus 1,000 mL (0 mL Intravenous Stopped 6/17/25 2145)       ED Risk Strat Scores   HEART Risk Score      Flowsheet Row Most Recent Value   Heart Score Risk Calculator    History 0 Filed at:  06/17/2025 2121   ECG 0 Filed at: 06/17/2025 2121   Age 1 Filed at: 06/17/2025 2121   Risk Factors 0 Filed at: 06/17/2025 2121   Troponin 0 Filed at: 06/17/2025 2121   HEART Score 1 Filed at: 06/17/2025 2121          HEART Risk Score      Flowsheet Row Most Recent Value   Heart Score Risk Calculator    History 0 Filed at: 06/17/2025 2121   ECG 0 Filed at: 06/17/2025 2121   Age 1 Filed at: 06/17/2025 2121   Risk Factors 0 Filed at: 06/17/2025 2121   Troponin 0 Filed at: 06/17/2025 2121   HEART Score 1 Filed at: 06/17/2025 2121                      No data recorded                            History of Present Illness       Chief Complaint   Patient presents with    Chest Pain     Reports palpitations with mid sternum CP started about an hour ago radiating to shoulder blades, reports some lightheadedness.  Denies SOB/N/V   Reports recently finishing 5 days of prednisone and antibiotic for URI.        Past Medical History[1]   Past Surgical History[2]   Family History[3]   Social History[4]   E-Cigarette/Vaping    E-Cigarette Use Never User       E-Cigarette/Vaping Substances    Nicotine No     THC No     CBD No       I have reviewed and agree with the history as documented.     54-year-old female with no significant past medical history presents for evaluation due to persistent palpitation.  Patient reports that she has had episodes of palpitations in the past and they normally self resolved.  Today patient had an episode that lasted approximately 5 to 10 minutes.  Patient also had associated chest pressure in the midsternal region.  Patient states that approximately a week ago she was diagnosed with a respiratory infection and was started on steroids and antibiotics.  Patient reports that she finished her steroids yesterday and is still taking the antibiotics.  Patient denies any current systemic fever, chills or diaphoresis.  Patient reports that her symptoms have resolved however she did want an evaluation to rule  out any cardiac etiology.  Patient reports that in the past she has seen a rheumatologist due to concern for an autoimmune illness and was on steroids but has since been weaned off of them.  Patient denies any current drug or alcohol use.          Review of Systems   Constitutional:  Negative for chills and fever.   HENT:  Negative for ear pain and sore throat.    Eyes:  Negative for pain and visual disturbance.   Respiratory:  Negative for cough and shortness of breath.    Cardiovascular:  Positive for palpitations. Negative for chest pain.   Gastrointestinal:  Negative for abdominal pain and vomiting.   Genitourinary:  Negative for dysuria and hematuria.   Musculoskeletal:  Negative for arthralgias and back pain.   Skin:  Negative for color change and rash.   Neurological:  Negative for seizures and syncope.   All other systems reviewed and are negative.          Objective       ED Triage Vitals   Temperature Pulse Blood Pressure Respirations SpO2 Patient Position - Orthostatic VS   06/17/25 1925 06/17/25 1925 06/17/25 1925 06/17/25 1925 06/17/25 1925 06/17/25 2145   97.8 °F (36.6 °C) 104 164/84 17 97 % Sitting      Temp Source Heart Rate Source BP Location FiO2 (%) Pain Score    06/17/25 1925 06/17/25 1925 06/17/25 2145 -- 06/17/25 2223    Oral Monitor Left arm  2      Vitals      Date and Time Temp Pulse SpO2 Resp BP Pain Score FACES Pain Rating User   06/17/25 2230 -- 73 95 % -- -- -- -- KB   06/17/25 2223 -- -- -- -- -- 2 --    06/17/25 2200 -- 79 95 % -- 112/68 -- -- KB   06/17/25 2157 -- -- 96 % -- -- -- -- KB   06/17/25 2145 -- 93 96 % -- 112/68 -- -- KB   06/17/25 1930 -- 104 95 % -- 118/85 -- -- KB   06/17/25 1925 97.8 °F (36.6 °C) 104 97 % 17 164/84 -- -- SH            Physical Exam  Vitals and nursing note reviewed.   Constitutional:       General: She is not in acute distress.     Appearance: She is well-developed.   HENT:      Head: Normocephalic and atraumatic.     Eyes:      Conjunctiva/sclera:  Conjunctivae normal.       Cardiovascular:      Rate and Rhythm: Regular rhythm. Tachycardia present.      Heart sounds: No murmur heard.  Pulmonary:      Effort: Pulmonary effort is normal. No respiratory distress.      Breath sounds: Normal breath sounds.   Chest:      Chest wall: No tenderness.   Abdominal:      Palpations: Abdomen is soft.      Tenderness: There is no abdominal tenderness. There is no guarding.     Musculoskeletal:         General: No swelling.      Cervical back: Neck supple.     Skin:     General: Skin is warm and dry.      Capillary Refill: Capillary refill takes less than 2 seconds.     Neurological:      General: No focal deficit present.      Mental Status: She is alert.     Psychiatric:         Mood and Affect: Mood normal.         Results Reviewed       Procedure Component Value Units Date/Time    HS Troponin I 2hr [[id number]]  (Normal) Collected: 06/17/25 2150    Lab Status: Final result Specimen: Blood from Arm, Left Updated: 06/17/25 2219     hs TnI 2hr 9 ng/L      Delta 2hr hsTnI 5 ng/L     T4, free [[id number]] Collected: 06/17/25 1954    Lab Status: In process Specimen: Blood from Arm, Left Updated: 06/17/25 2122    HS Troponin I 4hr [[id number]]     Lab Status: No result Specimen: Blood     HS Troponin 0hr (reflex protocol) [[id number]]  (Normal) Collected: 06/17/25 1954    Lab Status: Final result Specimen: Blood from Arm, Left Updated: 06/17/25 2048     hs TnI 0hr 4 ng/L     TSH [[id number]]  (Normal) Collected: 06/17/25 1954    Lab Status: Final result Specimen: Blood from Arm, Left Updated: 06/17/25 2048     TSH 3RD GENERATION 0.541 uIU/mL     D-dimer, quantitative [[id number]]  (Normal) Collected: 06/17/25 1954    Lab Status: Final result Specimen: Blood from Arm, Left Updated: 06/17/25 2027     D-Dimer, Quant <0.27 ug/ml FEU     Narrative:      In the evaluation for possible pulmonary embolism, in the appropriate (Well's Score of 4 or less) patient, the age adjusted d-dimer  cutoff for this patient can be calculated as:    Age x 0.01 (in ug/mL) for Age-adjusted D-dimer exclusion threshold for a patient over 50 years.    Comprehensive metabolic panel [[id number]] Collected: 06/17/25 1954    Lab Status: Final result Specimen: Blood from Arm, Left Updated: 06/17/25 2021     Sodium 140 mmol/L      Potassium 3.7 mmol/L      Chloride 105 mmol/L      CO2 27 mmol/L      ANION GAP 8 mmol/L      BUN 14 mg/dL      Creatinine 0.78 mg/dL      Glucose 139 mg/dL      Calcium 9.6 mg/dL      AST 16 U/L      ALT 16 U/L      Alkaline Phosphatase 69 U/L      Total Protein 7.3 g/dL      Albumin 4.7 g/dL      Total Bilirubin 0.33 mg/dL      eGFR 87 ml/min/1.73sq m     Narrative:      National Kidney Disease Foundation guidelines for Chronic Kidney Disease (CKD):     Stage 1 with normal or high GFR (GFR > 90 mL/min/1.73 square meters)    Stage 2 Mild CKD (GFR = 60-89 mL/min/1.73 square meters)    Stage 3A Moderate CKD (GFR = 45-59 mL/min/1.73 square meters)    Stage 3B Moderate CKD (GFR = 30-44 mL/min/1.73 square meters)    Stage 4 Severe CKD (GFR = 15-29 mL/min/1.73 square meters)    Stage 5 End Stage CKD (GFR <15 mL/min/1.73 square meters)  Note: GFR calculation is accurate only with a steady state creatinine    CBC and differential [[id number]]  (Abnormal) Collected: 06/17/25 1954    Lab Status: Final result Specimen: Blood from Arm, Left Updated: 06/17/25 2008     WBC 8.19 Thousand/uL      RBC 4.58 Million/uL      Hemoglobin 14.1 g/dL      Hematocrit 42.3 %      MCV 92 fL      MCH 30.8 pg      MCHC 33.3 g/dL      RDW 11.9 %      MPV 10.9 fL      Platelets 331 Thousands/uL      nRBC 0 /100 WBCs      Segmented % 78 %      Immature Grans % 1 %      Lymphocytes % 16 %      Monocytes % 5 %      Eosinophils Relative 0 %      Basophils Relative 0 %      Absolute Neutrophils 6.38 Thousands/µL      Absolute Immature Grans 0.04 Thousand/uL      Absolute Lymphocytes 1.32 Thousands/µL      Absolute Monocytes 0.44  Thousand/µL      Eosinophils Absolute 0.00 Thousand/µL      Basophils Absolute 0.01 Thousands/µL     UA w Reflex to Microscopic w Reflex to Culture [[id number]]  (Abnormal) Collected: 06/17/25 1949    Lab Status: Final result Specimen: Urine, Clean Catch Updated: 06/17/25 2000     Color, UA Colorless     Clarity, UA Clear     Specific Gravity, UA 1.002     pH, UA 7.0     Leukocytes, UA Negative     Nitrite, UA Negative     Protein, UA Negative mg/dl      Glucose, UA Negative mg/dl      Ketones, UA Negative mg/dl      Urobilinogen, UA <2.0 mg/dl      Bilirubin, UA Negative     Occult Blood, UA Negative    POCT pregnancy, urine [[id number]]  (Normal) Collected: 06/17/25 1946    Lab Status: Final result Updated: 06/17/25 1949     EXT Preg Test, Ur Negative     Control Valid            X-ray chest 1 view portable    (Results Pending)       Procedures    ED Medication and Procedure Management   Prior to Admission Medications   Prescriptions Last Dose Informant Patient Reported? Taking?   Lactobacillus (PROBIOTIC ACIDOPHILUS PO) 6/16/2025 at 11:00 PM  Yes Yes   Sig: Take 1 tablet by mouth in the morning   Progesterone 100 MG CAPS 6/16/2025 at 11:00 PM  No Yes   Sig: Take 100 mg by mouth at bedtime   cefdinir (OMNICEF) 300 mg capsule 6/17/2025 at  9:00 AM  Yes Yes   Sig: Take 1 capsule by mouth in the morning and 1 capsule before bedtime.   estradiol (ESTRACE) 0.5 MG tablet 6/16/2025 at 11:00 PM  No Yes   Sig: TAKE 1 TABLET(0.5 MG) BY MOUTH THREE TIMES DAILY   meloxicam (MOBIC) 7.5 mg tablet More than a month  Yes No   Sig: Take 7.5 mg by mouth in the morning and 7.5 mg in the evening. Take with meals.   multivitamin (THERAGRAN) TABS 6/16/2025 at 11:00 PM Self Yes Yes   Sig: Take 1 tablet by mouth in the morning.   predniSONE 20 mg tablet 6/17/2025 at  9:00 AM  Yes Yes   Sig: Take 2 tablets by mouth in the morning      Facility-Administered Medications: None     Discharge Medication List as of 6/17/2025 10:25 PM         CONTINUE these medications which have NOT CHANGED    Details   cefdinir (OMNICEF) 300 mg capsule Take 1 capsule by mouth in the morning and 1 capsule before bedtime., Starting Fri 6/13/2025, Historical Med      estradiol (ESTRACE) 0.5 MG tablet TAKE 1 TABLET(0.5 MG) BY MOUTH THREE TIMES DAILY, Normal      Lactobacillus (PROBIOTIC ACIDOPHILUS PO) Take 1 tablet by mouth in the morning, Historical Med      multivitamin (THERAGRAN) TABS Take 1 tablet by mouth in the morning., Historical Med      predniSONE 20 mg tablet Take 2 tablets by mouth in the morning, Starting Fri 6/13/2025, Historical Med      Progesterone 100 MG CAPS Take 100 mg by mouth at bedtime, Starting Tue 4/1/2025, Normal      meloxicam (MOBIC) 7.5 mg tablet Take 7.5 mg by mouth in the morning and 7.5 mg in the evening. Take with meals., Starting Sun 7/7/2024, Historical Med           No discharge procedures on file.  ED SEPSIS DOCUMENTATION   Time reflects when diagnosis was documented in both MDM as applicable and the Disposition within this note       Time User Action Codes Description Comment    6/17/2025 10:22 PM Rebecca Patterson Add [R00.2] Palpitations                      [1]   Past Medical History:  Diagnosis Date    Osteopenia     on DEXA    Papanicolaou smear 02/20/2017    NEG    Premature ovarian failure    [2]   Past Surgical History:  Procedure Laterality Date    COLONOSCOPY  06/2022    normal-repeat in 10 years    DXA PROCEDURE (HISTORICAL)  11/29/2012    osteopenia    HERNIA REPAIR      age 12    MAMMO (HISTORICAL) Bilateral 12/07/2017    NEG; STABLE    VAGINAL DELIVERY      WISDOM TOOTH EXTRACTION      age 20   [3]   Family History  Problem Relation Name Age of Onset    No Known Problems Mother      Heart attack Father      Asthma Sister      No Known Problems Daughter      No Known Problems Maternal Grandmother      No Known Problems Maternal Grandfather      No Known Problems Paternal Grandmother      No Known Problems Paternal  Grandfather      Breast cancer Maternal Aunt  30    No Known Problems Son      No Known Problems Son      No Known Problems Maternal Uncle      No Known Problems Paternal Uncle      No Known Problems Paternal Uncle      Colon cancer Neg Hx      Endometrial cancer Neg Hx      Ovarian cancer Neg Hx     [4]   Social History  Tobacco Use    Smoking status: Never    Smokeless tobacco: Never   Vaping Use    Vaping status: Never Used   Substance Use Topics    Alcohol use: Not Currently    Drug use: Never        Rebecca Patterson MD  06/18/25 0031

## 2025-06-18 LAB
ATRIAL RATE: 100 BPM
ATRIAL RATE: 83 BPM
P AXIS: 68 DEGREES
P AXIS: 76 DEGREES
PR INTERVAL: 156 MS
PR INTERVAL: 164 MS
QRS AXIS: 38 DEGREES
QRS AXIS: 47 DEGREES
QRSD INTERVAL: 76 MS
QRSD INTERVAL: 78 MS
QT INTERVAL: 320 MS
QT INTERVAL: 356 MS
QTC INTERVAL: 412 MS
QTC INTERVAL: 418 MS
T WAVE AXIS: 43 DEGREES
T WAVE AXIS: 44 DEGREES
T4 FREE SERPL-MCNC: 0.81 NG/DL (ref 0.61–1.12)
VENTRICULAR RATE: 100 BPM
VENTRICULAR RATE: 83 BPM

## 2025-06-18 PROCEDURE — 93010 ELECTROCARDIOGRAM REPORT: CPT | Performed by: INTERNAL MEDICINE

## 2025-06-18 NOTE — DISCHARGE INSTRUCTIONS
"Please follow up with your PCP to evaluate for thyroid dysfunction given borderline TSH.     Patient Education     Palpitations   The Basics   Written by the doctors and editors at [facility]   What are palpitations? -- Palpitations are changes in your heartbeat that you can feel. You might feel like your heart is beating hard, beating fast, or skipping a beat. Some people describe the feeling as a fluttering or pounding in the chest, neck, or throat.  Most of the time, palpitations are not serious, and go away on their own. But sometimes, they are related to a heart condition that needs treatment.  What causes palpitations? -- There are lots of possible causes. They include:   Heart problems   High blood sugar   Stress, anxiety, or panic attacks   Thyroid problems or other hormone disorders   Certain medicines   Pregnancy   Exercise   Fever   Smoking, alcohol, caffeine, or other substances  Will I need tests? -- Probably. If you have palpitations, your doctor or nurse can do several things to try to figure out the cause:   They will do an exam and ask about your palpitations. They might ask how long you have had them, what they feel like, and when they happen. They might also ask whether things like exercise or changing positions make your palpitations better or worse.   They will also ask about your health. This includes any other symptoms you have, medicines you take, or substances you use.  Your doctor or nurse will also do:   An electrocardiogram (\"ECG\") - This test measures the electrical activity in your heart (figure 1).  Depending on your situation, you might get other tests, such as:   Blood tests   Echocardiogram - This is an imaging test. It creates pictures of your heart as it beats.   Heart monitoring - This involves wearing a small, portable machine that records your heart's electrical activity.  How are palpitations treated? -- Treatment depends on what is causing your palpitations. For example:   If " you have a heart problem, your doctor can prescribe medicines or suggest other treatments.   If you have another health condition, such as diabetes, you might need to take medicines or make lifestyle changes.   If your palpitations are related to a medicine you take, your doctor might switch the medicine or change your dose.   If you have anxiety or panic attacks, your doctor or nurse can suggest treatments that can help.  Sometimes, palpitations go away on their own. This might happen if they were related to a temporary condition, like a fever.  Is there anything I can do on my own? -- You can:   Take all of your medicines as instructed.   Avoid smoking. If you are having trouble quitting smoking, your doctor or nurse can help.   Avoid or limit alcohol and caffeine.   Try to get regular physical activity. Even gentle forms of activity, like walking, are good for your health. If exercise causes palpitations or other symptoms, talk to your doctor or nurse about what kinds of physical activity are safe to do.   Find healthy ways to cope with stress.  When should I call the doctor? -- Some heart problems can increase your risk of heart attack or stroke.  Call for an ambulance (in the [place] and [place], call 9-1-1) if:   You have signs of a heart attack. This might include severe chest pain, pressure, or discomfort with:   Breathing trouble, sweating, upset stomach, or cold and clammy skin   Pain in your arms, back, or jaw   Worse pain with activity like walking up stairs   Fast or irregular heartbeat   Feeling dizzy, faint, or weak   You have signs of a stroke, like sudden:   Numbness or weakness of the face, arm, or leg, especially on 1 side of the body   Confusion, or trouble speaking or understanding   Trouble seeing in 1 or both eyes   Trouble walking, dizziness, or loss of balance or coordination   Severe headache with no known cause  Call your doctor or nurse for advice if:   You notice new changes in your  "heartbeat.   You have other new symptoms that worry you.  All topics are updated as new evidence becomes available and our peer review process is complete.  This topic retrieved from [employer] on: Mar 13, 2024.  Topic 394156 Version 2.0  Release: 32.2.4 - C32.71  © 2024 [employer] and/or its affiliates. All rights reserved.  figure 1: Person having an ECG     This drawing shows a person having an ECG (also called an electrocardiogram or EKG). There are patches, called \"electrodes,\" stuck onto the chest, arms, and legs. Wires run from the electrodes to the ECG machine. An ECG measures the electrical activity in the heart.  Graphic 51073 Version 3.0  Consumer Information Use and Disclaimer   Disclaimer: This generalized information is a limited summary of diagnosis, treatment, and/or medication information. It is not meant to be comprehensive and should be used as a tool to help the user understand and/or assess potential diagnostic and treatment options. It does NOT include all information about conditions, treatments, medications, side effects, or risks that may apply to a specific patient. It is not intended to be medical advice or a substitute for the medical advice, diagnosis, or treatment of a health care provider based on the health care provider's examination and assessment of a patient's specific and unique circumstances. Patients must speak with a health care provider for complete information about their health, medical questions, and treatment options, including any risks or benefits regarding use of medications. This information does not endorse any treatments or medications as safe, effective, or approved for treating a specific patient. [employer] and its affiliates disclaim any warranty or liability relating to this information or the use thereof.The use of this information is governed by the Terms of Use, available at https://www.[employer].com/en/know/clinical-effectiveness-terms. 2024© " [employer] and its affiliates and/or licensors. All rights reserved.  Copyright   © 2024 [employer] and/or its affiliates. All rights reserved.

## 2025-07-10 ENCOUNTER — ANNUAL EXAM (OUTPATIENT)
Dept: OBGYN CLINIC | Facility: CLINIC | Age: 55
End: 2025-07-10
Payer: COMMERCIAL

## 2025-07-10 VITALS
SYSTOLIC BLOOD PRESSURE: 106 MMHG | HEIGHT: 63 IN | DIASTOLIC BLOOD PRESSURE: 64 MMHG | WEIGHT: 138 LBS | BODY MASS INDEX: 24.45 KG/M2

## 2025-07-10 DIAGNOSIS — R63.5 WEIGHT GAIN: ICD-10-CM

## 2025-07-10 DIAGNOSIS — Z12.4 CERVICAL CANCER SCREENING: ICD-10-CM

## 2025-07-10 DIAGNOSIS — Z12.11 SCREENING FOR COLON CANCER: ICD-10-CM

## 2025-07-10 DIAGNOSIS — Z01.419 WELL WOMAN EXAM WITH ROUTINE GYNECOLOGICAL EXAM: ICD-10-CM

## 2025-07-10 DIAGNOSIS — Z12.4 ENCOUNTER FOR SCREENING FOR MALIGNANT NEOPLASM OF CERVIX: ICD-10-CM

## 2025-07-10 DIAGNOSIS — Z11.51 SCREENING FOR HPV (HUMAN PAPILLOMAVIRUS): ICD-10-CM

## 2025-07-10 DIAGNOSIS — E28.39 PREMATURE OVARIAN FAILURE: ICD-10-CM

## 2025-07-10 DIAGNOSIS — Z12.31 ENCOUNTER FOR SCREENING MAMMOGRAM FOR MALIGNANT NEOPLASM OF BREAST: ICD-10-CM

## 2025-07-10 DIAGNOSIS — Z01.419 ENCOUNTER FOR WELL WOMAN EXAM: Primary | ICD-10-CM

## 2025-07-10 DIAGNOSIS — Z12.39 ENCOUNTER FOR SCREENING BREAST EXAMINATION: ICD-10-CM

## 2025-07-10 PROCEDURE — G0476 HPV COMBO ASSAY CA SCREEN: HCPCS | Performed by: PHYSICIAN ASSISTANT

## 2025-07-10 PROCEDURE — G0145 SCR C/V CYTO,THINLAYER,RESCR: HCPCS | Performed by: PHYSICIAN ASSISTANT

## 2025-07-10 PROCEDURE — S0612 ANNUAL GYNECOLOGICAL EXAMINA: HCPCS | Performed by: PHYSICIAN ASSISTANT

## 2025-07-11 LAB
HPV HR 12 DNA CVX QL NAA+PROBE: NEGATIVE
HPV16 DNA CVX QL NAA+PROBE: NEGATIVE
HPV18 DNA CVX QL NAA+PROBE: NEGATIVE

## 2025-07-15 LAB
LAB AP GYN PRIMARY INTERPRETATION: NORMAL
Lab: NORMAL